# Patient Record
Sex: FEMALE | Race: OTHER | NOT HISPANIC OR LATINO
[De-identification: names, ages, dates, MRNs, and addresses within clinical notes are randomized per-mention and may not be internally consistent; named-entity substitution may affect disease eponyms.]

---

## 2022-08-08 ENCOUNTER — APPOINTMENT (OUTPATIENT)
Dept: INTERNAL MEDICINE | Facility: CLINIC | Age: 42
End: 2022-08-08

## 2023-09-07 ENCOUNTER — NON-APPOINTMENT (OUTPATIENT)
Age: 43
End: 2023-09-07

## 2023-09-07 ENCOUNTER — APPOINTMENT (OUTPATIENT)
Dept: INTERNAL MEDICINE | Facility: CLINIC | Age: 43
End: 2023-09-07
Payer: COMMERCIAL

## 2023-09-07 VITALS
HEART RATE: 81 BPM | TEMPERATURE: 98 F | SYSTOLIC BLOOD PRESSURE: 106 MMHG | WEIGHT: 136 LBS | DIASTOLIC BLOOD PRESSURE: 74 MMHG | OXYGEN SATURATION: 99 % | HEIGHT: 62.4 IN | BODY MASS INDEX: 24.71 KG/M2

## 2023-09-07 DIAGNOSIS — Z80.52 FAMILY HISTORY OF MALIGNANT NEOPLASM OF BLADDER: ICD-10-CM

## 2023-09-07 DIAGNOSIS — Z00.00 ENCOUNTER FOR GENERAL ADULT MEDICAL EXAMINATION W/OUT ABNORMAL FINDINGS: ICD-10-CM

## 2023-09-07 DIAGNOSIS — Z82.49 FAMILY HISTORY OF ISCHEMIC HEART DISEASE AND OTHER DISEASES OF THE CIRCULATORY SYSTEM: ICD-10-CM

## 2023-09-07 DIAGNOSIS — Z11.3 ENCOUNTER FOR SCREENING FOR INFECTIONS WITH A PREDOMINANTLY SEXUAL MODE OF TRANSMISSION: ICD-10-CM

## 2023-09-07 DIAGNOSIS — Z13.228 ENCOUNTER FOR SCREENING FOR OTHER METABOLIC DISORDERS: ICD-10-CM

## 2023-09-07 DIAGNOSIS — Z13.6 ENCOUNTER FOR SCREENING FOR CARDIOVASCULAR DISORDERS: ICD-10-CM

## 2023-09-07 PROCEDURE — 93000 ELECTROCARDIOGRAM COMPLETE: CPT

## 2023-09-07 PROCEDURE — 81003 URINALYSIS AUTO W/O SCOPE: CPT | Mod: QW

## 2023-09-07 PROCEDURE — 36415 COLL VENOUS BLD VENIPUNCTURE: CPT

## 2023-09-07 PROCEDURE — 99386 PREV VISIT NEW AGE 40-64: CPT | Mod: 25

## 2023-09-07 NOTE — PHYSICAL EXAM
[No Acute Distress] : no acute distress [Well-Appearing] : well-appearing [Normal Sclera/Conjunctiva] : normal sclera/conjunctiva [Normal Outer Ear/Nose] : the outer ears and nose were normal in appearance [No Lymphadenopathy] : no lymphadenopathy [No Respiratory Distress] : no respiratory distress  [No Accessory Muscle Use] : no accessory muscle use [Clear to Auscultation] : lungs were clear to auscultation bilaterally [Normal Rate] : normal rate  [Regular Rhythm] : with a regular rhythm [Normal S1, S2] : normal S1 and S2 [No Edema] : there was no peripheral edema [Soft] : abdomen soft [Non Tender] : non-tender [Non-distended] : non-distended [Normal Bowel Sounds] : normal bowel sounds [No Spinal Tenderness] : no spinal tenderness [Grossly Normal Strength/Tone] : grossly normal strength/tone [Normal Gait] : normal gait [Normal Affect] : the affect was normal [Alert and Oriented x3] : oriented to person, place, and time

## 2023-09-07 NOTE — HISTORY OF PRESENT ILLNESS
[de-identified] : 42 yo F presenting for CPE. Immunizations up to date. She would like to be referred to a psychiatrist to be evaluated for ADD.

## 2023-09-07 NOTE — HEALTH RISK ASSESSMENT
[Fair] : ~his/her~ current health as fair  [Good] : ~his/her~  mood as  good [No] : In the past 12 months have you used drugs other than those required for medical reasons? No [No falls in past year] : Patient reported no falls in the past year [0] : 2) Feeling down, depressed, or hopeless: Not at all (0) [Patient reported mammogram was normal] : Patient reported mammogram was normal [Patient reported PAP Smear was normal] : Patient reported PAP Smear was normal [HIV Test offered] : HIV Test offered [Hepatitis C test offered] : Hepatitis C test offered [With Significant Other] : lives with significant other [Unemployed] : unemployed [] :  [Feels Safe at Home] : Feels safe at home [Smoke Detector] : smoke detector [Carbon Monoxide Detector] : carbon monoxide detector [Safety elements used in home] : safety elements used in home [Seat Belt] :  uses seat belt [Sunscreen] : uses sunscreen [Never] : Never [FreeTextEntry1] : referral to be screened for ADHD [de-identified] : no [de-identified] : no [de-identified] : walking  [de-identified] : good [Change in mental status noted] : No change in mental status noted [Language] : denies difficulty with language [Behavior] : denies difficulty with behavior [Learning/Retaining New Information] : denies difficulty learning/retaining new information [Handling Complex Tasks] : denies difficulty handling complex tasks [Reasoning] : denies difficulty with reasoning [Spatial Ability and Orientation] : denies difficulty with spatial ability and orientation [Sexually Active] : not sexually active [Reports changes in hearing] : Reports no changes in hearing [Reports changes in vision] : Reports no changes in vision [Reports changes in dental health] : Reports no changes in dental health [MammogramDate] : 06/2023 [PapSmearDate] : 05/2023

## 2023-09-08 LAB
25(OH)D3 SERPL-MCNC: 35.6 NG/ML
ALBUMIN SERPL ELPH-MCNC: 4.7 G/DL
ALP BLD-CCNC: 64 U/L
ALT SERPL-CCNC: 9 U/L
ANION GAP SERPL CALC-SCNC: 13 MMOL/L
APPEARANCE: CLEAR
AST SERPL-CCNC: 11 U/L
BACTERIA: ABNORMAL /HPF
BILIRUB SERPL-MCNC: 0.5 MG/DL
BILIRUBIN URINE: NEGATIVE
BLOOD URINE: NEGATIVE
BUN SERPL-MCNC: 16 MG/DL
CALCIUM SERPL-MCNC: 9.6 MG/DL
CAST: 0 /LPF
CHLORIDE SERPL-SCNC: 104 MMOL/L
CHOLEST SERPL-MCNC: 254 MG/DL
CO2 SERPL-SCNC: 24 MMOL/L
COLOR: YELLOW
CREAT SERPL-MCNC: 0.83 MG/DL
EGFR: 90 ML/MIN/1.73M2
EPITHELIAL CELLS: 9 /HPF
ESTIMATED AVERAGE GLUCOSE: 108 MG/DL
GLUCOSE QUALITATIVE U: NEGATIVE MG/DL
GLUCOSE SERPL-MCNC: 111 MG/DL
HBA1C MFR BLD HPLC: 5.4 %
HDLC SERPL-MCNC: 74 MG/DL
HIV1+2 AB SPEC QL IA.RAPID: NONREACTIVE
KETONES URINE: NEGATIVE MG/DL
LDLC SERPL CALC-MCNC: 164 MG/DL
LEUKOCYTE ESTERASE URINE: NEGATIVE
MICROSCOPIC-UA: NORMAL
NITRITE URINE: NEGATIVE
NONHDLC SERPL-MCNC: 180 MG/DL
PH URINE: 6.5
POTASSIUM SERPL-SCNC: 4.3 MMOL/L
PROT SERPL-MCNC: 7 G/DL
PROTEIN URINE: NORMAL MG/DL
RED BLOOD CELLS URINE: 3 /HPF
SODIUM SERPL-SCNC: 140 MMOL/L
SPECIFIC GRAVITY URINE: 1.03
TRIGL SERPL-MCNC: 93 MG/DL
TSH SERPL-ACNC: 0.78 UIU/ML
UROBILINOGEN URINE: 1 MG/DL
VIT B12 SERPL-MCNC: 497 PG/ML
WHITE BLOOD CELLS URINE: 0 /HPF

## 2023-10-17 ENCOUNTER — APPOINTMENT (OUTPATIENT)
Dept: PSYCHIATRY | Facility: CLINIC | Age: 43
End: 2023-10-17
Payer: COMMERCIAL

## 2023-10-17 ENCOUNTER — TRANSCRIPTION ENCOUNTER (OUTPATIENT)
Age: 43
End: 2023-10-17

## 2023-10-17 PROCEDURE — 90791 PSYCH DIAGNOSTIC EVALUATION: CPT

## 2023-12-18 ENCOUNTER — APPOINTMENT (OUTPATIENT)
Dept: PSYCHIATRY | Facility: CLINIC | Age: 43
End: 2023-12-18
Payer: COMMERCIAL

## 2023-12-18 ENCOUNTER — OUTPATIENT (OUTPATIENT)
Dept: OUTPATIENT SERVICES | Facility: HOSPITAL | Age: 43
LOS: 1 days | Discharge: HOME | End: 2023-12-18

## 2023-12-18 PROCEDURE — 99205 OFFICE O/P NEW HI 60 MIN: CPT | Mod: 95

## 2023-12-19 NOTE — PSYCHOSOCIAL ASSESSMENT
[None known] : None known [Yes (add details)] : Patient attended school, home tutoring, or received education instruction at anytime in the past three months? 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 No [FreeTextEntry2] : The patient is currently  but  from her . They reside together in the same home for financial reasons. She has a five-year-old son with her current  and a fifteen-year-old son from a previous relationship. 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 This led to legal and financial struggles, especially as she was trying to maintain her visa status and caring for her two-year-old son without a partner's support. She then worked as a  and  until 2015, which is when she  her current , the father of her youngest child. Patient had a miscarriage, no further details discussed during the intake.  The patient demonstrates strength in managing daily parenting tasks despite her attention difficulties. She has established routines to help stay on track with her activities and parenting responsibilities. However, she faces financial challenges as she is currently unemployed and striving to achieve financial independence from her , with whom she has been  for several years. [FreeTextEntry3] : The patient indicates that her family resides in [place], and within the [place], her social [place] primarily consists of individuals she has met through her sons, such as other parents from their schools. Among these acquaintances, there is only one person with whom she feels comfortable sharing her confidences. She expresses a sense of loss because her friendships in the [place] have not been formed through mutual interests. 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

## 2023-12-19 NOTE — PLAN
[Admit to Program     (Add Program Admission information to a new column in the Admit/Discharge Flowsheet)] : Admit to program [Every ___ week(s)] : Medication Management: Every [unfilled] week(s) [Individual Therapy] : Individual Therapy [FreeTextEntry4] : The patient would likely benefit from a comprehensive treatment approach that includes neuropsychological testing to better understand her cognitive profile, psychotropic medication to manage symptoms of inattention, and individual therapy. The therapy could focus on cultivating organizational skills and behavioral modification strategies. Additionally, therapeutic work on self-esteem would be beneficial to challenge and improve her negative self-perception.

## 2023-12-19 NOTE — DISCUSSION/SUMMARY
[Low acute suicide risk] : Low acute suicide risk [No] : No [Not clinically indicated] : Safety Plan completed/updated (for individuals at risk): Not clinically indicated [Initial Plan] : Initial Plan [Articulate] : articulate [Insightful] : insightful [Motivated to participate in treatment] : motivated to participate in treatment [Motivated and ready for change] : motivated and ready for change [In good health] : in good health [Housing stability] : housing stability [Has vocational interests or hobbies] : has vocational interests or hobbies [English fluency] : English fluency [Mental Health] : Mental Health [FreeTextEntry2] : 3/18/2024 [FreeTextEntry3] : 12/18/2023 [Interpersonal Relationships] : Interpersonal Relationships [Initial] : Initial [every ___ weeks] : every [unfilled] weeks [___ times a week] : [unfilled] times a week [Treatment is no longer medically necessary as evidenced by:] : Treatment is no longer medically necessary as evidenced by: [None - Reason others did not participate:] : None - Reason others did not participate:  [Yes] : Yes [Psychiatric Provider/Prescriber] : Psychiatric Provider/Prescriber [FreeTextEntry4] : "I'd like to better manage my attention problems to help manage the household and complete my study." [de-identified] : Cultivating organization skills and behavioral modification strategies [de-identified] : 3/18/2024 [de-identified] : Improving self-esteem [de-identified] : 3/18/2024 [FreeTextEntry5] : The patient would likely benefit from a comprehensive treatment approach that includes neuropsychological testing to better understand her cognitive profile, psychotropic medication to manage symptoms of inattention, and individual therapy. The therapy could focus on cultivating organizational skills and behavioral modification strategies. Additionally, therapeutic work on self-esteem would be beneficial to challenge and improve her negative self-perception. [de-identified] : Improved attention functioning [FreeTextEntry1] : Patient denies any current or past suicidality. The following info is from the writer's clinical observation and clinical interview. Warning signs: depressed mood Risk factors: financial stress Protective factor: responsibility to her children, a goal to become a
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
## 2023-12-19 NOTE — DISCUSSION/SUMMARY
[Low acute suicide risk] : Low acute suicide risk [No] : No [Not clinically indicated] : Safety Plan completed/updated (for individuals at risk): Not clinically indicated [Initial Plan] : Initial Plan [Articulate] : articulate [Insightful] : insightful [Motivated to participate in treatment] : motivated to participate in treatment [Motivated and ready for change] : motivated and ready for change [In good health] : in good health [Housing stability] : housing stability [Has vocational interests or hobbies] : has vocational interests or hobbies [English fluency] : English fluency [Mental Health] : Mental Health [FreeTextEntry2] : 3/18/2024 [FreeTextEntry3] : 12/18/2023 [Interpersonal Relationships] : Interpersonal Relationships [Initial] : Initial [every ___ weeks] : every [unfilled] weeks [___ times a week] : [unfilled] times a week [Treatment is no longer medically necessary as evidenced by:] : Treatment is no longer medically necessary as evidenced by: [None - Reason others did not participate:] : None - Reason others did not participate:  [Yes] : Yes [Psychiatric Provider/Prescriber] : Psychiatric Provider/Prescriber [FreeTextEntry4] : "I'd like to better manage my attention problems to help manage the household and complete my study." [de-identified] : Cultivating organization skills and behavioral modification strategies [de-identified] : 3/18/2024 [de-identified] : Improving self-esteem [de-identified] : 3/18/2024 [FreeTextEntry5] : The patient would likely benefit from a comprehensive treatment approach that includes neuropsychological testing to better understand her cognitive profile, psychotropic medication to manage symptoms of inattention, and individual therapy. The therapy could focus on cultivating organizational skills and behavioral modification strategies. Additionally, therapeutic work on self-esteem would be beneficial to challenge and improve her negative self-perception. [de-identified] : Improved attention functioning [FreeTextEntry1] : Patient denies any current or past suicidality. The following info is from the writer's clinical observation and clinical interview. Warning signs: depressed mood Risk factors: financial stress Protective factor: responsibility to her children, a goal to become a

## 2023-12-19 NOTE — HISTORY OF PRESENT ILLNESS
[Not Applicable] : Not applicable [FreeTextEntry1] : Patient indicates the present problems of attention have been sustaining in her "whole life." The patient recounts enduring difficulties with schoolwork from an early age and being labeled as "lazy" by her family. She mentions that she needed assistance at school to maintain focus on tasks and experienced challenges with reading. At the age of [age], she underwent testing but was deemed "normal." The patient denies having any formally recognized learning disabilities or having received special education. [FreeTextEntry2] : Denied past inpatient/outpatient tx.  [FreeTextEntry3] : Denied

## 2023-12-19 NOTE — PSYCHOSOCIAL ASSESSMENT
[None known] : None known [Yes (add details)] : Patient attended school, home tutoring, or received education instruction at anytime in the past three months? 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 No [FreeTextEntry2] : The patient is currently  but  from her . They reside together in the same home for financial reasons. She has a five-year-old son with her current  and a fifteen-year-old son from a previous relationship. 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 This led to legal and financial struggles, especially as she was trying to maintain her visa status and caring for her two-year-old son without a partner's support. She then worked as a  and  until 2015, which is when she  her current , the father of her youngest child. Patient had a miscarriage, no further details discussed during the intake.  The patient demonstrates strength in managing daily parenting tasks despite her attention difficulties. She has established routines to help stay on track with her activities and parenting responsibilities. However, she faces financial challenges as she is currently unemployed and striving to achieve financial independence from her , with whom she has been  for several years. [FreeTextEntry3] : The patient indicates that her family resides in [place], and within the [place], her social [place] primarily consists of individuals she has met through her sons, such as other parents from their schools. Among these acquaintances, there is only one person with whom she feels comfortable sharing her confidences. She expresses a sense of loss because her friendships in the [place] have not been formed through mutual interests. 

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
## 2023-12-19 NOTE — REASON FOR VISIT
[Access issues (e.g., transportation, impaired mobility, etc.)] : due to patient's access issues [Telehealth (audio & video) - Individual/Group] : This visit was provided via telehealth using real-time 2-way audio visual technology. [Other Location: e.g. Home (Enter Location, City,State)___] : The provider was located at [unfilled]. [Home] : The patient, [unfilled], was located at home, [unfilled], at the time of the visit. [Verbal consent obtained from patient/other participant(s)] : Verbal consent for telehealth/telephonic services obtained from patient/other participant(s) [OK  to leave message] : OK  to leave message [Private Practice - Therapist/Psychologist] : Private Practice - Therapist/Psychologist [[facility] Provider/Facility] : [facility] Provider/Facility [Patient] : Patient [FreeTextEntry4] : 9:00 [FreeTextEntry3] : [email or web address] [FreeTextEntry5] : English [FreeTextEntry2] : Concerns about ADHD [FreeTextEntry1] : The patient reports experiencing attention issues that have affected her ability to study an online stenography program and manage household tasks. She frequently makes careless mistakes both at home and in her daily activities. She struggles to stay focused while studying and during conversations, often seeming to not listen when spoken to directly. Following through on instructions is a challenge for her, as is organizing tasks effectively. She has an aversion to, or dislikes, engaging in tasks that require sustained attention, such as studying or waiting in a line. She often misplaces personal items, such as her phone and purse, and is easily sidetracked by unrelated thoughts. Her forgetfulness is pervasive, and she has a tendency to jump from one task to another without completing them. Additionally, she indicates that sometimes she is unable to interact quietly with others and tends to overshare; she also becomes irritable or loses patience when multiple people speak to her at once.

## 2023-12-19 NOTE — ACTIVE PROBLEMS
[FreeTextEntry1] : The patient reports that symptoms of inattention have negatively affected her self-esteem, leading her to ruminate over social interactions, feel embarrassed about her disorganized home, and struggle with studying despite her efforts. She describes herself generally as a "happy person" with a "decent mood" but acknowledges that she is "easily frustrated by life," including routine chores and parenting tasks. She reports frequent skin and hair picking behavior when feeling anxious or distressed.  The patient has faced challenges with her weight and has experimented with various diets. She has a history of binge eating, particularly unhealthy foods like snacks and candy. Her eating habits oscillate between strict health-conscious diets and indulgence in unhealthy snacking. Since Halloween, she has been predominantly snacking on foods lacking nutritional value. She denies any history of purging behavior.

## 2023-12-19 NOTE — RISK ASSESSMENT
[Clinical Interview] : Clinical Interview [No known suicide factors] : No known suicide factors [ADHD] : ADHD [Change in provider or treatment (i.e., medications, psychotherapy, milieu)] : change in provider or treatment (i.e., medications, psychotherapy, milieu) [Triggering events leading to humiliation, shame, and/or despair] : triggering events leading to humiliation, shame, and/or despair (e.g. loss of relationship, financial or health status) (real or anticipated) [Inadequate social supports] : inadequate social supports [Ability to cope with stress] : ability to cope with stress [Responsibility to children, family, or others] : responsibility to children, family, or others [Engaged in work or school] : engaged in work or school [None in the patient's lifetime] : None in the patient's lifetime [None Known] : none known [No known risk factors] : No known risk factors [No, patient denies ideation or behavior] : No, patient denies ideation or behavior [Low acute suicide risk] : Low acute suicide risk [No] : No [Not clinically indicated] : Safety Plan completed/updated (for individuals at risk): Not clinically indicated [FreeTextEntry9] : No signs of acute risk. [FreeTextEntry1] : Denied any current or past suicidality. [FreeTextEntry6] : Patient denies any current/past suicidality.

## 2023-12-19 NOTE — REASON FOR VISIT
[Access issues (e.g., transportation, impaired mobility, etc.)] : due to patient's access issues [Telehealth (audio & video) - Individual/Group] : This visit was provided via telehealth using real-time 2-way audio visual technology. [Other Location: e.g. Home (Enter Location, City,State)___] : The provider was located at [unfilled]. [Home] : The patient, [unfilled], was located at home, [unfilled], at the time of the visit. [Verbal consent obtained from patient/other participant(s)] : Verbal consent for telehealth/telephonic services obtained from patient/other participant(s) [OK  to leave message] : OK  to leave message [Private Practice - Therapist/Psychologist] : Private Practice - Therapist/Psychologist [[facility] Provider/Facility] : [facility] Provider/Facility [Patient] : Patient [FreeTextEntry4] : 9:00 [FreeTextEntry3] : [email or web address] [FreeTextEntry5] : English [FreeTextEntry2] : Concerns about ADHD [FreeTextEntry1] : The patient reports experiencing attention issues that have affected her ability to study an online stenography program and manage household tasks. She frequently makes careless mistakes both at home and in her daily activities. She struggles to stay focused while studying and during conversations, often seeming to not listen when spoken to directly. Following through on instructions is a challenge for her, as is organizing tasks effectively. She has an aversion to, or dislikes, engaging in tasks that require sustained attention, such as studying or waiting in a line. She often misplaces personal items, such as her phone and purse, and is easily sidetracked by unrelated thoughts. Her forgetfulness is pervasive, and she has a tendency to jump from one task to another without completing them. Additionally, she indicates that sometimes she is unable to interact quietly with others and tends to overshare; she also becomes irritable or loses patience when multiple people speak to her at once.

## 2024-01-08 DIAGNOSIS — F43.23 ADJUSTMENT DISORDER WITH MIXED ANXIETY AND DEPRESSED MOOD: ICD-10-CM

## 2024-01-09 ENCOUNTER — APPOINTMENT (OUTPATIENT)
Dept: PSYCHIATRY | Facility: CLINIC | Age: 44
End: 2024-01-09
Payer: COMMERCIAL

## 2024-01-09 VITALS — HEIGHT: 62.4 IN | BODY MASS INDEX: 25.44 KG/M2 | WEIGHT: 140 LBS

## 2024-01-09 DIAGNOSIS — I34.1 NONRHEUMATIC MITRAL (VALVE) PROLAPSE: ICD-10-CM

## 2024-01-09 PROCEDURE — 99215 OFFICE O/P EST HI 40 MIN: CPT

## 2024-01-26 ENCOUNTER — APPOINTMENT (OUTPATIENT)
Dept: PSYCHIATRY | Facility: CLINIC | Age: 44
End: 2024-01-26
Payer: COMMERCIAL

## 2024-01-26 PROCEDURE — 99214 OFFICE O/P EST MOD 30 MIN: CPT | Mod: 95

## 2024-01-26 NOTE — REASON FOR VISIT
[Access issues (e.g., transportation, impaired mobility, etc.)] : due to patient's access issues [Telehealth (audio & video) - Individual/Group] : This visit was provided via telehealth using real-time 2-way audio visual technology. [Other Location: e.g. Home (Enter Location, City,State)___] : The provider was located at [unfilled]. [Home] : The patient, [unfilled], was located at home, [unfilled], at the time of the visit. [Verbal consent obtained from patient/other participant(s)] : Verbal consent for telehealth/telephonic services obtained from patient/other participant(s) [FreeTextEntry4] : 11:30a [FreeTextEntry5] : 12:15p

## 2024-01-26 NOTE — PHYSICAL EXAM
[Cooperative] : cooperative [Euthymic] : euthymic [Full] : full [Clear] : clear [Linear/Goal Directed] : linear/goal directed [None Reported] : none reported [Average] : average [WNL] : within normal limits [FreeTextEntry8] : "pretty good"

## 2024-01-26 NOTE — PSYCHOSOCIAL ASSESSMENT
[None known] : None known [Yes (add details)] : Patient attended school, home tutoring, or received education instruction at anytime in the past three months? 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 No [FreeTextEntry2] : The patient is currently  but  from her . They reside together in the same home for financial reasons. She has a five-year-old son with her current  and a fifteen-year-old son from a previous relationship. 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 This led to legal and financial struggles, especially as she was trying to maintain her visa status and caring for her two-year-old son without a partner's support. She then worked as a  and  until 2015, which is when she  her current , the father of her youngest child. Patient had a miscarriage, no further details discussed during the intake.  The patient demonstrates strength in managing daily parenting tasks despite her attention difficulties. She has established routines to help stay on track with her activities and parenting responsibilities. However, she faces financial challenges as she is currently unemployed and striving to achieve financial independence from her , with whom she has been  for several years. [FreeTextEntry3] : The patient indicates that her family resides in [place], and within the [place], her social [place] primarily consists of individuals she has met through her sons, such as other parents from their schools. Among these acquaintances, there is only one person with whom she feels comfortable sharing her confidences. She expresses a sense of loss because her friendships in the [place] have not been formed through mutual interests. 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

## 2024-01-26 NOTE — PLAN
[FreeTextEntry4] : Pt attending appts and starting medication for ADHD. [FreeTextEntry5] : PLAN: #) Initiate Concerta 18mg PO Qam #) f/u with psychiatry in 4 weeks

## 2024-01-26 NOTE — RISK ASSESSMENT
[No, patient denies ideation or behavior] : No, patient denies ideation or behavior [No] : No [FreeTextEntry9] : Low acute risk. No SI, plan, attempt. Low chronic risk - protective factors include family, connection to care, future orientation.

## 2024-01-26 NOTE — DISCUSSION/SUMMARY
[Low acute suicide risk] : Low acute suicide risk [No] : No [Not clinically indicated] : Safety Plan completed/updated (for individuals at risk): Not clinically indicated [FreeTextEntry1] : Patient denies any current or past suicidality. The following info is from the writer's clinical observation and clinical interview. Warning signs: depressed mood Risk factors: financial stress Protective factor: responsibility to her children, a goal to become a

## 2024-01-26 NOTE — SOCIAL HISTORY
[FreeTextEntry1] : Lives with  ( - in process of divorce), 17yo M, and 5yo M sons. Studying stenography.

## 2024-02-26 ENCOUNTER — APPOINTMENT (OUTPATIENT)
Dept: PSYCHIATRY | Facility: CLINIC | Age: 44
End: 2024-02-26
Payer: COMMERCIAL

## 2024-02-26 PROCEDURE — 99215 OFFICE O/P EST HI 40 MIN: CPT | Mod: 95

## 2024-02-26 RX ORDER — METHYLPHENIDATE HYDROCHLORIDE 18 MG/1
18 TABLET, EXTENDED RELEASE ORAL
Qty: 30 | Refills: 0 | Status: DISCONTINUED | COMMUNITY
Start: 2024-01-09 | End: 2024-02-26

## 2024-02-26 NOTE — PHYSICAL EXAM
[] : No [Individual reports tobacco use during the last 30 days?] : Individual reports tobacco use during the last 30 days? No [0 - 1 or 2] : 0 - 1 or 2 [0 - Never] : 0 - Never [0 - No] : 0 - No [5] : 5 [3] : 3 [4] : 4 [FreeTextEntry1] : 0 [SchwartzScore] : 40 [Cooperative] : cooperative [Euthymic] : euthymic [Clear] : clear [Full] : full [Linear/Goal Directed] : linear/goal directed [None Reported] : none reported [Average] : average [WNL] : within normal limits [FreeTextEntry8] : "pretty good"

## 2024-02-26 NOTE — SOCIAL HISTORY
[FreeTextEntry1] : Lives with  ( - in process of divorce), 15yo M, and 7yo M sons. Studying stenography.

## 2024-02-26 NOTE — DISCUSSION/SUMMARY
[Plan Review] : Plan Review [Able to manage surrounding demands and opportunities] : able to manage surrounding demands and opportunities [Attempting to realize their potential] : Attempting to realize their potential [Cognitively intact] : cognitively intact [Good impulse control] : good impulse control [Motivated to participate in treatment] : motivated to participate in treatment [Housing stability] : housing stability [In good health] : in good health [FreeTextEntry2] : 2/26/25 [FreeTextEntry3] : see intake note [FreeTextEntry5] : "Get ADHD under control..." [FreeTextEntry8] : n/a [Mental Health] : Mental Health [Continued - Progress made] : Continued - Progress made: [every ___ weeks] : every [unfilled] weeks [Yes - Participants:] : Yes - Participants: [Other rationale for transition/discharge:] : Other rationale for transition/discharge: [No - Explain] : No [Psychiatric Provider/Prescriber] : Psychiatric Provider/Prescriber [FreeTextEntry4] : "struggling with my focus - on tasks and study. Messy house. I'm forgetful and disorganized." [de-identified] : Trying to identify appropriate medication to manage sxs [de-identified] : 2/26/25 [de-identified] : Trialing ADHD medications [de-identified] : Increase SOS by 10% [de-identified] : in progress [de-identified] : currently at 40 [de-identified] : 2/26/25 [de-identified] : Sharon [de-identified] : Patient will need ongoing psych mgmt of ADHD [de-identified] : Sharon [Date of Last Physical Exam: _____] : Date of Last Physical Exam: [unfilled] [Date of Last Annual Labs: _____] : Date of Last Annual Labs: [unfilled] [Annual Review of Systems Completed?] : Annual Review of Systems Completed: Yes [Tobacco Screening Completed?] : Tobacco Screening Completed: Yes [Potential impact of patient’s physical health conditions on psychiatric care?] : Potential impact of patient's physical health conditions on psychiatric care: No [Does patient require any additional health services or referrals?] : Does patient require any additional health services or referrals: No [No] : No [Low acute suicide risk] : Low acute suicide risk [Not clinically indicated] : Safety Plan completed/updated (for individuals at risk): Not clinically indicated [FreeTextEntry1] : Patient denies any current or past suicidality. The following info is from the writer's clinical observation and clinical interview. Warning signs: depressed mood Risk factors: financial stress Protective factor: responsibility to her children, a goal to become a

## 2024-02-26 NOTE — PSYCHOSOCIAL ASSESSMENT
[None known] : None known [Yes (add details)] : Patient attended school, home tutoring, or received education instruction at anytime in the past three months? 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 No [FreeTextEntry2] : The patient is currently  but  from her . They reside together in the same home for financial reasons. She has a five-year-old son with her current  and a fifteen-year-old son from a previous relationship. 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 This led to legal and financial struggles, especially as she was trying to maintain her visa status and caring for her two-year-old son without a partner's support. She then worked as a  and  until 2015, which is when she  her current , the father of her youngest child. Patient had a miscarriage, no further details discussed during the intake.  The patient demonstrates strength in managing daily parenting tasks despite her attention difficulties. She has established routines to help stay on track with her activities and parenting responsibilities. However, she faces financial challenges as she is currently unemployed and striving to achieve financial independence from her , with whom she has been  for several years. [FreeTextEntry3] : The patient indicates that her family resides in [place], and within the [place], her social [place] primarily consists of individuals she has met through her sons, such as other parents from their schools. Among these acquaintances, there is only one person with whom she feels comfortable sharing her confidences. She expresses a sense of loss because her friendships in the [place] have not been formed through mutual interests. 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

## 2024-02-26 NOTE — PLAN
[Medication education provided] : Medication education provided. [Yes. details: ___] : Yes, [unfilled] [Order(s) for medication placed in [facility] EHR] : Medication [FreeTextEntry4] : Pt attending appts and starting medication for ADHD. [FreeTextEntry5] : PLAN: #) Increase Concerta 18mg to 27mg PO Qam #) f/u with psychiatry in 4 weeks

## 2024-02-26 NOTE — RISK ASSESSMENT
[Low acute suicide risk] : Low acute suicide risk [Not clinically indicated] : Safety Plan completed/updated (for individuals at risk): Not clinically indicated [No, patient denies ideation or behavior] : No, patient denies ideation or behavior [No] : No [FreeTextEntry9] : Low acute risk. No SI, plan, attempt. Low chronic risk - protective factors include family, connection to care, future orientation.

## 2024-02-26 NOTE — HISTORY OF PRESENT ILLNESS
[Not Applicable] : Not applicable [FreeTextEntry1] : Patient indicates the present problems of attention have been sustaining in her "whole life." The patient recounts enduring difficulties with schoolwork from an early age and being labeled as "lazy" by her family. She mentions that she needed assistance at school to maintain focus on tasks and experienced challenges with reading. At the age of [age], she underwent testing but was deemed "normal." The patient denies having any formally recognized learning disabilities or having received special education.  The patient reports that symptoms of inattention have negatively affected her self-esteem, leading her to ruminate over social interactions, feel embarrassed about her disorganized home, and struggle with studying despite her efforts. She describes herself generally as a "happy person" with a "decent mood" but acknowledges that she is "easily frustrated by life," including routine chores and parenting tasks. She reports frequent skin and hair picking behavior when feeling anxious or distressed.  The patient has faced challenges with her weight and has experimented with various diets. She has a history of binge eating, particularly unhealthy foods like snacks and candy. Her eating habits oscillate between strict health-conscious diets and indulgence in unhealthy snacking. Since Halloween, she has been predominantly snacking on foods lacking nutritional value. She denies any history of purging behavior. [FreeTextEntry2] : Denied past inpatient/outpatient tx.  [FreeTextEntry3] : Denied

## 2024-02-26 NOTE — PSYCHOSOCIAL ASSESSMENT
[None known] : None known [Yes (add details)] : Patient attended school, home tutoring, or received education instruction at anytime in the past three months? 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 No [FreeTextEntry3] : The patient indicates that her family resides in [place], and within the [place], her social [place] primarily consists of individuals she has met through her sons, such as other parents from their schools. Among these acquaintances, there is only one person with whom she feels comfortable sharing her confidences. She expresses a sense of loss because her friendships in the [place] have not been formed through mutual interests. [FreeTextEntry2] : The patient is currently  but  from her . They reside together in the same home for financial reasons. She has a five-year-old son with her current  and a fifteen-year-old son from a previous relationship. 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 This led to legal and financial struggles, especially as she was trying to maintain her visa status and caring for her two-year-old son without a partner's support. She then worked as a  and  until 2015, which is when she  her current , the father of her youngest child. Patient had a miscarriage, no further details discussed during the intake.  The patient demonstrates strength in managing daily parenting tasks despite her attention difficulties. She has established routines to help stay on track with her activities and parenting responsibilities. However, she faces financial challenges as she is currently unemployed and striving to achieve financial independence from her , with whom she has been  for several years. [FreeTextEntry1] : Patient is an immigrant from [place] with no family in the [place]

## 2024-02-26 NOTE — DISCUSSION/SUMMARY
[Able to manage surrounding demands and opportunities] : able to manage surrounding demands and opportunities [Plan Review] : Plan Review [Attempting to realize their potential] : Attempting to realize their potential [Cognitively intact] : cognitively intact [Good impulse control] : good impulse control [Motivated to participate in treatment] : motivated to participate in treatment [Housing stability] : housing stability [In good health] : in good health [FreeTextEntry2] : 2/26/25 [FreeTextEntry3] : see intake note [FreeTextEntry5] : "Get ADHD under control..." [FreeTextEntry8] : n/a [Mental Health] : Mental Health [Continued - Progress made] : Continued - Progress made: [every ___ weeks] : every [unfilled] weeks [Other rationale for transition/discharge:] : Other rationale for transition/discharge: [Yes - Participants:] : Yes - Participants: [No - Explain] : No [Psychiatric Provider/Prescriber] : Psychiatric Provider/Prescriber [FreeTextEntry4] : "struggling with my focus - on tasks and study. Messy house. I'm forgetful and disorganized." [de-identified] : Trying to identify appropriate medication to manage sxs [de-identified] : Trialing ADHD medications [de-identified] : 2/26/25 [de-identified] : Increase SOS by 10% [de-identified] : in progress [de-identified] : currently at 40 [de-identified] : 2/26/25 [de-identified] : Sharon [de-identified] : Patient will need ongoing psych mgmt of ADHD [de-identified] : Sharon [Date of Last Physical Exam: _____] : Date of Last Physical Exam: [unfilled] [Date of Last Annual Labs: _____] : Date of Last Annual Labs: [unfilled] [Annual Review of Systems Completed?] : Annual Review of Systems Completed: Yes [Tobacco Screening Completed?] : Tobacco Screening Completed: Yes [Potential impact of patient’s physical health conditions on psychiatric care?] : Potential impact of patient's physical health conditions on psychiatric care: No [Does patient require any additional health services or referrals?] : Does patient require any additional health services or referrals: No [No] : No [Low acute suicide risk] : Low acute suicide risk [Not clinically indicated] : Safety Plan completed/updated (for individuals at risk): Not clinically indicated [FreeTextEntry1] : Patient denies any current or past suicidality. The following info is from the writer's clinical observation and clinical interview. Warning signs: depressed mood Risk factors: financial stress Protective factor: responsibility to her children, a goal to become a

## 2024-02-26 NOTE — PLAN
[Yes. details: ___] : Yes, [unfilled] [Medication education provided] : Medication education provided. [Order(s) for medication placed in [facility] EHR] : Medication [FreeTextEntry4] : Pt attending appts and starting medication for ADHD. [FreeTextEntry5] : PLAN: #) Increase Concerta 18mg to 27mg PO Qam #) f/u with psychiatry in 4 weeks

## 2024-02-26 NOTE — PHYSICAL EXAM
[Individual reports tobacco use during the last 30 days?] : Individual reports tobacco use during the last 30 days? No [] : No [0 - 1 or 2] : 0 - 1 or 2 [0 - Never] : 0 - Never [0 - No] : 0 - No [5] : 5 [3] : 3 [4] : 4 [FreeTextEntry1] : 0 [SchwartzScore] : 40 [Cooperative] : cooperative [Euthymic] : euthymic [Full] : full [Clear] : clear [Linear/Goal Directed] : linear/goal directed [None Reported] : none reported [Average] : average [WNL] : within normal limits [FreeTextEntry8] : "pretty good"

## 2024-03-07 ENCOUNTER — APPOINTMENT (OUTPATIENT)
Dept: PSYCHIATRY | Facility: CLINIC | Age: 44
End: 2024-03-07

## 2024-03-08 DIAGNOSIS — F98.8 OTHER SPECIFIED BEHAVIORAL AND EMOTIONAL DISORDERS WITH ONSET USUALLY OCCURRING IN CHILDHOOD AND ADOLESCENCE: ICD-10-CM

## 2024-03-08 DIAGNOSIS — F90.0 ATTENTION-DEFICIT HYPERACTIVITY DISORDER, PREDOMINANTLY INATTENTIVE TYPE: ICD-10-CM

## 2024-03-12 ENCOUNTER — APPOINTMENT (OUTPATIENT)
Dept: PSYCHIATRY | Facility: CLINIC | Age: 44
End: 2024-03-12
Payer: COMMERCIAL

## 2024-03-12 PROCEDURE — 99213 OFFICE O/P EST LOW 20 MIN: CPT | Mod: 95

## 2024-03-12 NOTE — PSYCHOSOCIAL ASSESSMENT
[None known] : None known [Yes (add details)] : Patient attended school, home tutoring, or received education instruction at anytime in the past three months? 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 No [FreeTextEntry2] : The patient is currently  but  from her . They reside together in the same home for financial reasons. She has a five-year-old son with her current  and a fifteen-year-old son from a previous relationship. 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 This led to legal and financial struggles, especially as she was trying to maintain her visa status and caring for her two-year-old son without a partner's support. She then worked as a  and  until 2015, which is when she  her current , the father of her youngest child. Patient had a miscarriage, no further details discussed during the intake.  The patient demonstrates strength in managing daily parenting tasks despite her attention difficulties. She has established routines to help stay on track with her activities and parenting responsibilities. However, she faces financial challenges as she is currently unemployed and striving to achieve financial independence from her , with whom she has been  for several years. [FreeTextEntry3] : The patient indicates that her family resides in [place], and within the [place], her social [place] primarily consists of individuals she has met through her sons, such as other parents from their schools. Among these acquaintances, there is only one person with whom she feels comfortable sharing her confidences. She expresses a sense of loss because her friendships in the [place] have not been formed through mutual interests. 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

## 2024-03-12 NOTE — REASON FOR VISIT
[Access issues (e.g., transportation, impaired mobility, etc.)] : due to patient's access issues [Telehealth (audio & video) - Individual/Group] : This visit was provided via telehealth using real-time 2-way audio visual technology. [Other Location: e.g. Home (Enter Location, City,State)___] : The provider was located at [unfilled]. [Verbal consent obtained from patient/other participant(s)] : Verbal consent for telehealth/telephonic services obtained from patient/other participant(s) [Home] : The patient, [unfilled], was located at home, [unfilled], at the time of the visit. [FreeTextEntry4] : 11:30a [FreeTextEntry5] : 12:15p

## 2024-03-12 NOTE — SOCIAL HISTORY
[FreeTextEntry1] : Lives with  ( - in process of divorce), 15yo M, and 5yo M sons. Studying stenography.

## 2024-03-12 NOTE — PLAN
[Yes. details: ___] : Yes, [unfilled] [Medication education provided] : Medication education provided. [Order(s) for medication placed in [facility] EHR] : Medication [FreeTextEntry4] : Pt attending appts and starting medication for ADHD. [FreeTextEntry5] : PLAN: #) d/c Concerta 27mg PO Qam #) Initiate adderall xr 10mg PO Qam #) f/u with psychiatry in 2 weeks

## 2024-03-12 NOTE — PHYSICAL EXAM
Latex:  Patient denies allergy to latex.  Medications reviewed with patient.  Tobacco use verified.  Allergies verified.    REFERRING MD:   at School  Primary Medical Doctor: Karen Reyes MD   DATE OF INJURY/SURGERY:  DOI Dec 2020, DOS 5/13/21  WORK RELATED: no  OCCUPATION: student    Pt is accompanied by father.    Patient is here for LEFT knee arthroscopy, chondroplasty, loose body removal, VMO reefing follow up.  Reports no porblems with the knee.  Not taking anything for pain.     [Cooperative] : cooperative [Euthymic] : euthymic [Full] : full [Clear] : clear [Linear/Goal Directed] : linear/goal directed [None Reported] : none reported [Average] : average [WNL] : within normal limits [FreeTextEntry8] : "pretty good"

## 2024-03-13 ENCOUNTER — APPOINTMENT (OUTPATIENT)
Dept: PSYCHIATRY | Facility: CLINIC | Age: 44
End: 2024-03-13

## 2024-03-28 ENCOUNTER — APPOINTMENT (OUTPATIENT)
Dept: PSYCHIATRY | Facility: CLINIC | Age: 44
End: 2024-03-28
Payer: COMMERCIAL

## 2024-03-28 PROCEDURE — 90834 PSYTX W PT 45 MINUTES: CPT | Mod: 95

## 2024-03-28 NOTE — PLAN
[FreeTextEntry2] : Pt to engage in weekly or biweekly individual psychotherapy to address sxs of ADHD, binge eating, and social anxiety  [Cognitive and/or Behavior Therapy] : Cognitive and/or Behavior Therapy  [Dialectical Behavior Therapy] : Dialectical Behavior Therapy  [Psychodynamic Therapy] : Psychodynamic Therapy  [Integrative Therapy] : Integrative Therapy  [Psychoeducation] : Psychoeducation  [de-identified] : Pt arrived on time for scheduled session. Presented as engaged and motivated for txt; affect was stable and euthymic. Session focused on rapport building and information gathering as well as goal setting, as this was pt's first session with writer. Writer provided support, feedback, and coping skills as needed throughout. Ended session with pt in bx and ex control.

## 2024-03-28 NOTE — REASON FOR VISIT
[Patient preference] : as per patient preference [Telehealth (audio & video) - Individual/Group] : This visit was provided via telehealth using real-time 2-way audio visual technology. [Other Location: e.g. Home (Enter Location, City,State)___] : The provider was located at [unfilled]. [Home] : The patient, [unfilled], was located at home, [unfilled], at the time of the visit. [Verbal consent obtained from patient/other participant(s)] : Verbal consent for telehealth/telephonic services obtained from patient/other participant(s) [FreeTextEntry4] : 12:45pm [FreeTextEntry5] : 1:30pm [Patient] : Patient [FreeTextEntry1] : To address sxs of adhd

## 2024-03-28 NOTE — RISK ASSESSMENT
[No, patient denies ideation or behavior] : No, patient denies ideation or behavior [ADHD] : ADHD [Impulsivity] : impulsivity [Identifies reasons for living] : identifies reasons for living [Supportive social network of family or friends] : supportive social network of family or friends [Positive therapeutic relationships] : positive therapeutic relationships [Ability to cope with stress] : ability to cope with stress [Responsibility to children, family, or others] : responsibility to children, family, or others [Low acute suicide risk] : Low acute suicide risk [No] : No [Not clinically indicated] : Safety Plan completed/updated (for individuals at risk): Not clinically indicated

## 2024-03-28 NOTE — DISCUSSION/SUMMARY
[Does patient require any additional health services or referrals?] : Does patient require any additional health services or referrals: No [Potential impact of patient’s physical health conditions on psychiatric care?] : Potential impact of patient's physical health conditions on psychiatric care: No

## 2024-03-29 ENCOUNTER — APPOINTMENT (OUTPATIENT)
Dept: PSYCHIATRY | Facility: CLINIC | Age: 44
End: 2024-03-29
Payer: COMMERCIAL

## 2024-03-29 PROCEDURE — 99214 OFFICE O/P EST MOD 30 MIN: CPT | Mod: 95

## 2024-03-29 RX ORDER — METHYLPHENIDATE HYDROCHLORIDE 27 MG/1
27 TABLET, EXTENDED RELEASE ORAL DAILY
Qty: 14 | Refills: 0 | Status: DISCONTINUED | COMMUNITY
Start: 2024-02-26 | End: 2024-03-29

## 2024-03-29 NOTE — SOCIAL HISTORY
[FreeTextEntry1] : Lives with  ( - in process of divorce), 17yo M, and 7yo M sons. Studying stenography.

## 2024-03-29 NOTE — RISK ASSESSMENT
[No, patient denies ideation or behavior] : No, patient denies ideation or behavior [FreeTextEntry9] : Low acute risk. No SI, plan, attempt. Low chronic risk - protective factors include family, connection to care, future orientation.  [Low acute suicide risk] : Low acute suicide risk [No] : No [Not clinically indicated] : Safety Plan completed/updated (for individuals at risk): Not clinically indicated

## 2024-03-29 NOTE — DISCUSSION/SUMMARY
[No] : No [Low acute suicide risk] : Low acute suicide risk [Not clinically indicated] : Safety Plan completed/updated (for individuals at risk): Not clinically indicated [FreeTextEntry1] : Patient denies any current or past suicidality. The following info is from the writer's clinical observation and clinical interview. Warning signs: depressed mood Risk factors: financial stress Protective factor: responsibility to her children, a goal to become a

## 2024-03-29 NOTE — PHYSICAL EXAM
[Cooperative] : cooperative [Euthymic] : euthymic [Full] : full [Clear] : clear [Linear/Goal Directed] : linear/goal directed [None Reported] : none reported [Average] : average [WNL] : within normal limits [FreeTextEntry8] : "ok"

## 2024-03-29 NOTE — PLAN
[Yes. details: ___] : Yes, [unfilled] [Medication education provided] : Medication education provided. [Order(s) for medication placed in [facility] EHR] : Medication [FreeTextEntry4] : Pt attending appts and starting medication for ADHD. [FreeTextEntry5] : PLAN: #) Initiate adderall xr 10mg PO Qam #) f/u with psychiatry in 3 weeks #) continue I therapy

## 2024-03-29 NOTE — PSYCHOSOCIAL ASSESSMENT
[None known] : None known [Yes (add details)] : Patient attended school, home tutoring, or received education instruction at anytime in the past three months? 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 No [FreeTextEntry2] : The patient is currently  but  from her . They reside together in the same home for financial reasons. She has a five-year-old son with her current  and a fifteen-year-old son from a previous relationship. 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 This led to legal and financial struggles, especially as she was trying to maintain her visa status and caring for her two-year-old son without a partner's support. She then worked as a  and  until 2015, which is when she  her current , the father of her youngest child. Patient had a miscarriage, no further details discussed during the intake.  The patient demonstrates strength in managing daily parenting tasks despite her attention difficulties. She has established routines to help stay on track with her activities and parenting responsibilities. However, she faces financial challenges as she is currently unemployed and striving to achieve financial independence from her , with whom she has been  for several years. [FreeTextEntry3] : The patient indicates that her family resides in [place], and within the [place], her social [place] primarily consists of individuals she has met through her sons, such as other parents from their schools. Among these acquaintances, there is only one person with whom she feels comfortable sharing her confidences. She expresses a sense of loss because her friendships in the [place] have not been formed through mutual interests. 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

## 2024-04-19 ENCOUNTER — APPOINTMENT (OUTPATIENT)
Dept: PSYCHIATRY | Facility: CLINIC | Age: 44
End: 2024-04-19

## 2024-04-26 ENCOUNTER — APPOINTMENT (OUTPATIENT)
Dept: PSYCHIATRY | Facility: CLINIC | Age: 44
End: 2024-04-26
Payer: COMMERCIAL

## 2024-04-26 PROCEDURE — 90833 PSYTX W PT W E/M 30 MIN: CPT | Mod: 95

## 2024-04-26 PROCEDURE — 99214 OFFICE O/P EST MOD 30 MIN: CPT | Mod: 95

## 2024-04-26 RX ORDER — DEXTROAMPHETAMINE SACCHARATE, AMPHETAMINE ASPARTATE MONOHYDRATE, DEXTROAMPHETAMINE SULFATE AND AMPHETAMINE SULFATE 2.5; 2.5; 2.5; 2.5 MG/1; MG/1; MG/1; MG/1
10 CAPSULE, EXTENDED RELEASE ORAL DAILY
Qty: 30 | Refills: 0 | Status: DISCONTINUED | COMMUNITY
Start: 2024-03-12 | End: 2024-04-26

## 2024-04-26 NOTE — REASON FOR VISIT
[Access issues (e.g., transportation, impaired mobility, etc.)] : due to patient's access issues [Telehealth (audio & video) - Individual/Group] : This visit was provided via telehealth using real-time 2-way audio visual technology. [Other Location: e.g. Home (Enter Location, City,State)___] : The provider was located at [unfilled]. [Home] : The patient, [unfilled], was located at home, [unfilled], at the time of the visit. [Verbal consent obtained from patient/other participant(s)] : Verbal consent for telehealth/telephonic services obtained from patient/other participant(s) [FreeTextEntry4] : 11:00a [FreeTextEntry5] : 11:30a

## 2024-04-26 NOTE — PSYCHOSOCIAL ASSESSMENT
[None known] : None known [Yes (add details)] : Patient attended school, home tutoring, or received education instruction at anytime in the past three months? 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 No [FreeTextEntry2] : The patient is currently  but  from her . They reside together in the same home for financial reasons. She has a five-year-old son with her current  and a fifteen-year-old son from a previous relationship. 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 This led to legal and financial struggles, especially as she was trying to maintain her visa status and caring for her two-year-old son without a partner's support. She then worked as a  and  until 2015, which is when she  her current , the father of her youngest child. Patient had a miscarriage, no further details discussed during the intake.  The patient demonstrates strength in managing daily parenting tasks despite her attention difficulties. She has established routines to help stay on track with her activities and parenting responsibilities. However, she faces financial challenges as she is currently unemployed and striving to achieve financial independence from her , with whom she has been  for several years. [FreeTextEntry3] : The patient indicates that her family resides in [place], and within the [place], her social [place] primarily consists of individuals she has met through her sons, such as other parents from their schools. Among these acquaintances, there is only one person with whom she feels comfortable sharing her confidences. She expresses a sense of loss because her friendships in the [place] have not been formed through mutual interests. 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

## 2024-04-26 NOTE — PLAN
[Yes. details: ___] : Yes, [unfilled] [Medication education provided] : Medication education provided. [Order(s) for medication placed in [facility] EHR] : Medication [FreeTextEntry4] : Pt attending appts and starting medication for ADHD. [FreeTextEntry5] : PLAN: #) Increase adderall xr 10mg to 15mg PO Qam #) f/u with psychiatry in 4 weeks #) continue I therapy

## 2024-04-26 NOTE — RISK ASSESSMENT
[No, patient denies ideation or behavior] : No, patient denies ideation or behavior [Low acute suicide risk] : Low acute suicide risk [No] : No [Not clinically indicated] : Safety Plan completed/updated (for individuals at risk): Not clinically indicated [FreeTextEntry9] : Low acute risk. No SI, plan, attempt. Low chronic risk - protective factors include family, connection to care, future orientation.

## 2024-05-01 ENCOUNTER — APPOINTMENT (OUTPATIENT)
Dept: PSYCHIATRY | Facility: CLINIC | Age: 44
End: 2024-05-01
Payer: COMMERCIAL

## 2024-05-01 PROCEDURE — 90834 PSYTX W PT 45 MINUTES: CPT | Mod: 95

## 2024-05-01 NOTE — DISCUSSION/SUMMARY
[Potential impact of patient’s physical health conditions on psychiatric care?] : Potential impact of patient's physical health conditions on psychiatric care: No [Does patient require any additional health services or referrals?] : Does patient require any additional health services or referrals: No

## 2024-05-01 NOTE — RISK ASSESSMENT
[No, patient denies ideation or behavior] : No, patient denies ideation or behavior [ADHD] : ADHD [Impulsivity] : impulsivity [Identifies reasons for living] : identifies reasons for living [Supportive social network of family or friends] : supportive social network of family or friends [Ability to cope with stress] : ability to cope with stress [Positive therapeutic relationships] : positive therapeutic relationships [Responsibility to children, family, or others] : responsibility to children, family, or others [Low acute suicide risk] : Low acute suicide risk [No] : No [Not clinically indicated] : Safety Plan completed/updated (for individuals at risk): Not clinically indicated

## 2024-05-01 NOTE — PLAN
[FreeTextEntry2] : Pt to engage in weekly or biweekly individual psychotherapy to address sxs of ADHD, binge eating, and social anxiety  [Cognitive and/or Behavior Therapy] : Cognitive and/or Behavior Therapy  [Dialectical Behavior Therapy] : Dialectical Behavior Therapy  [Integrative Therapy] : Integrative Therapy  [Psychodynamic Therapy] : Psychodynamic Therapy  [Psychoeducation] : Psychoeducation  [de-identified] : Pt arrived on time for scheduled session. Presented as engaged and motivated for txt; affect was stable and euthymic. Session focused on dynamics btwn pt, her , and 's family. Explored pt's difficulty with conflict and fears of being a burden to others - inside and outside of session. Writer provided support, feedback, and coping skills as needed throughout. Ended session with pt in bx and ex control.

## 2024-05-16 ENCOUNTER — APPOINTMENT (OUTPATIENT)
Dept: PSYCHIATRY | Facility: CLINIC | Age: 44
End: 2024-05-16
Payer: COMMERCIAL

## 2024-05-16 PROCEDURE — 90834 PSYTX W PT 45 MINUTES: CPT | Mod: 95

## 2024-05-17 NOTE — PLAN
[FreeTextEntry2] : Pt to engage in weekly or biweekly individual psychotherapy to address sxs of ADHD, binge eating, and social anxiety  [Cognitive and/or Behavior Therapy] : Cognitive and/or Behavior Therapy  [Dialectical Behavior Therapy] : Dialectical Behavior Therapy  [Integrative Therapy] : Integrative Therapy  [Psychodynamic Therapy] : Psychodynamic Therapy  [Psychoeducation] : Psychoeducation  [de-identified] : Pt arrived on time for scheduled session. Presented as engaged and motivated for txt; affect was stable and euthymic. Session focused on exploring pt's early relationship dynamics and their impact on present day interpersonal difficulties. Writer provided support, feedback, and coping skills as needed throughout. Ended session with pt in bx and ex control.

## 2024-05-23 ENCOUNTER — APPOINTMENT (OUTPATIENT)
Dept: PSYCHIATRY | Facility: CLINIC | Age: 44
End: 2024-05-23
Payer: COMMERCIAL

## 2024-05-23 PROCEDURE — 90834 PSYTX W PT 45 MINUTES: CPT | Mod: 95

## 2024-05-23 NOTE — PLAN
[FreeTextEntry2] : Pt to engage in weekly or biweekly individual psychotherapy to address sxs of ADHD, binge eating, and social anxiety  [Cognitive and/or Behavior Therapy] : Cognitive and/or Behavior Therapy  [Dialectical Behavior Therapy] : Dialectical Behavior Therapy  [Integrative Therapy] : Integrative Therapy  [Psychodynamic Therapy] : Psychodynamic Therapy  [Psychoeducation] : Psychoeducation  [de-identified] : Pt arrived on time for scheduled session. Presented as engaged and motivated for txt; affect was stable and euthymic. Session focused on pt's reactions to ex-'s planned date this evening. Further explored pt's relationship w ex and current "set up" in being split up but living together. Writer provided psychoeducation on attachment theory and object relations and how they might be playing out for pt in her choices of partners in adult life. Writer provided support, feedback, and coping skills as needed throughout. Ended session with pt in bx and ex control.

## 2024-05-23 NOTE — RISK ASSESSMENT
[No, patient denies ideation or behavior] : No, patient denies ideation or behavior [ADHD] : ADHD [Impulsivity] : impulsivity [Identifies reasons for living] : identifies reasons for living [Supportive social network of family or friends] : supportive social network of family or friends [Ability to cope with stress] : ability to cope with stress [Positive therapeutic relationships] : positive therapeutic relationships [Responsibility to children, family, or others] : responsibility to children, family, or others [Low acute suicide risk] : Low acute suicide risk [No] : No [Not clinically indicated] : Safety Plan completed/updated (for individuals at risk): Not clinically indicated Patient

## 2024-05-24 ENCOUNTER — APPOINTMENT (OUTPATIENT)
Dept: PSYCHIATRY | Facility: CLINIC | Age: 44
End: 2024-05-24
Payer: COMMERCIAL

## 2024-05-24 PROCEDURE — 99214 OFFICE O/P EST MOD 30 MIN: CPT | Mod: 95

## 2024-05-24 PROCEDURE — G2211 COMPLEX E/M VISIT ADD ON: CPT | Mod: NC,1L,95

## 2024-05-24 PROCEDURE — 90833 PSYTX W PT W E/M 30 MIN: CPT | Mod: 95

## 2024-05-24 RX ORDER — DEXTROAMPHETAMINE SACCHARATE, AMPHETAMINE ASPARTATE MONOHYDRATE, DEXTROAMPHETAMINE SULFATE AND AMPHETAMINE SULFATE 5; 5; 5; 5 MG/1; MG/1; MG/1; MG/1
20 CAPSULE, EXTENDED RELEASE ORAL EVERY MORNING
Qty: 30 | Refills: 0 | Status: ACTIVE | COMMUNITY
Start: 2024-05-24 | End: 1900-01-01

## 2024-05-24 RX ORDER — DEXTROAMPHETAMINE SACCHARATE, AMPHETAMINE ASPARTATE MONOHYDRATE, DEXTROAMPHETAMINE SULFATE AND AMPHETAMINE SULFATE 3.75; 3.75; 3.75; 3.75 MG/1; MG/1; MG/1; MG/1
15 CAPSULE, EXTENDED RELEASE ORAL DAILY
Qty: 30 | Refills: 0 | Status: DISCONTINUED | COMMUNITY
Start: 2024-04-26 | End: 2024-05-24

## 2024-05-24 NOTE — PLAN
[Yes. details: ___] : Yes, [unfilled] [Medication education provided] : Medication education provided. [Order(s) for medication placed in [facility] EHR] : Medication [FreeTextEntry4] : Pt attending appts and starting medication for ADHD. [FreeTextEntry5] : PLAN: #) Increase adderall xr 10mg to 15mg PO Qam #) f/u with psychiatry in 4 weeks #) continue I therapy

## 2024-05-24 NOTE — REASON FOR VISIT
[Access issues (e.g., transportation, impaired mobility, etc.)] : due to patient's access issues [Telehealth (audio & video) - Individual/Group] : This visit was provided via telehealth using real-time 2-way audio visual technology. [Other Location: e.g. Home (Enter Location, City,State)___] : The provider was located at [unfilled]. [Home] : The patient, [unfilled], was located at home, [unfilled], at the time of the visit. [Verbal consent obtained from patient/other participant(s)] : Verbal consent for telehealth/telephonic services obtained from patient/other participant(s) [FreeTextEntry4] : 10:30a [FreeTextEntry5] : 11:00a

## 2024-05-24 NOTE — PSYCHOSOCIAL ASSESSMENT
[None known] : None known [Yes (add details)] : Patient attended school, home tutoring, or received education instruction at anytime in the past three months? 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 No [FreeTextEntry2] : The patient is currently  but  from her . They reside together in the same home for financial reasons. She has a five-year-old son with her current  and a fifteen-year-old son from a previous relationship. 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 This led to legal and financial struggles, especially as she was trying to maintain her visa status and caring for her two-year-old son without a partner's support. She then worked as a  and  until 2015, which is when she  her current , the father of her youngest child. Patient had a miscarriage, no further details discussed during the intake.  The patient demonstrates strength in managing daily parenting tasks despite her attention difficulties. She has established routines to help stay on track with her activities and parenting responsibilities. However, she faces financial challenges as she is currently unemployed and striving to achieve financial independence from her , with whom she has been  for several years. [FreeTextEntry3] : The patient indicates that her family resides in [place], and within the [place], her social [place] primarily consists of individuals she has met through her sons, such as other parents from their schools. Among these acquaintances, there is only one person with whom she feels comfortable sharing her confidences. She expresses a sense of loss because her friendships in the [place] have not been formed through mutual interests. 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

## 2024-05-30 ENCOUNTER — APPOINTMENT (OUTPATIENT)
Dept: PSYCHIATRY | Facility: CLINIC | Age: 44
End: 2024-05-30
Payer: COMMERCIAL

## 2024-05-30 DIAGNOSIS — F98.8 OTHER SPECIFIED BEHAVIORAL AND EMOTIONAL DISORDERS WITH ONSET USUALLY OCCURRING IN CHILDHOOD AND ADOLESCENCE: ICD-10-CM

## 2024-05-30 PROCEDURE — 90834 PSYTX W PT 45 MINUTES: CPT | Mod: 95

## 2024-05-31 PROBLEM — F98.8 ATTENTION DEFICIT DISORDER OF ADULT: Status: ACTIVE | Noted: 2023-09-07

## 2024-05-31 NOTE — PLAN
[FreeTextEntry2] : Pt to engage in weekly or biweekly individual psychotherapy to address sxs of ADHD, binge eating, and social anxiety  [Cognitive and/or Behavior Therapy] : Cognitive and/or Behavior Therapy  [Dialectical Behavior Therapy] : Dialectical Behavior Therapy  [Integrative Therapy] : Integrative Therapy  [Psychodynamic Therapy] : Psychodynamic Therapy  [Psychoeducation] : Psychoeducation  [de-identified] : Pt arrived on time for scheduled session. Presented as engaged and motivated for txt; affect was stable and euthymic. Session focused on themes related to recent/upcoming changes in relationship with ex and impact on pt. Also discussed ways in which pt has lost touch w herself and strategies for becoming more connected with self again. Writer provided support, feedback, and coping skills as needed throughout. Ended session with pt in bx and ex control.

## 2024-06-01 ENCOUNTER — OUTPATIENT (OUTPATIENT)
Dept: OUTPATIENT SERVICES | Facility: HOSPITAL | Age: 44
LOS: 1 days | Discharge: HOME | End: 2024-06-01

## 2024-06-06 ENCOUNTER — APPOINTMENT (OUTPATIENT)
Dept: PSYCHIATRY | Facility: CLINIC | Age: 44
End: 2024-06-06

## 2024-06-13 ENCOUNTER — APPOINTMENT (OUTPATIENT)
Dept: PSYCHIATRY | Facility: CLINIC | Age: 44
End: 2024-06-13

## 2024-06-13 DIAGNOSIS — F43.23 ADJUSTMENT DISORDER WITH MIXED ANXIETY AND DEPRESSED MOOD: ICD-10-CM

## 2024-06-13 DIAGNOSIS — F98.8 OTHER SPECIFIED BEHAVIORAL AND EMOTIONAL DISORDERS WITH ONSET USUALLY OCCURRING IN CHILDHOOD AND ADOLESCENCE: ICD-10-CM

## 2024-06-13 DIAGNOSIS — F90.0 ATTENTION-DEFICIT HYPERACTIVITY DISORDER, PREDOMINANTLY INATTENTIVE TYPE: ICD-10-CM

## 2024-06-20 ENCOUNTER — APPOINTMENT (OUTPATIENT)
Dept: PSYCHIATRY | Facility: CLINIC | Age: 44
End: 2024-06-20

## 2024-06-21 ENCOUNTER — APPOINTMENT (OUTPATIENT)
Dept: PSYCHIATRY | Facility: CLINIC | Age: 44
End: 2024-06-21
Payer: COMMERCIAL

## 2024-06-21 PROCEDURE — 90833 PSYTX W PT W E/M 30 MIN: CPT | Mod: 95

## 2024-06-21 PROCEDURE — 99214 OFFICE O/P EST MOD 30 MIN: CPT | Mod: 95

## 2024-06-21 PROCEDURE — G2211 COMPLEX E/M VISIT ADD ON: CPT | Mod: NC,95

## 2024-06-21 RX ORDER — DEXTROAMPHETAMINE SACCHARATE, AMPHETAMINE ASPARTATE MONOHYDRATE, DEXTROAMPHETAMINE SULFATE AND AMPHETAMINE SULFATE 1.25; 1.25; 1.25; 1.25 MG/1; MG/1; MG/1; MG/1
5 CAPSULE, EXTENDED RELEASE ORAL EVERY MORNING
Qty: 30 | Refills: 0 | Status: ACTIVE | COMMUNITY
Start: 2024-06-21 | End: 1900-01-01

## 2024-06-21 RX ORDER — DEXTROAMPHETAMINE SACCHARATE, AMPHETAMINE ASPARTATE MONOHYDRATE, DEXTROAMPHETAMINE SULFATE AND AMPHETAMINE SULFATE 3.75; 3.75; 3.75; 3.75 MG/1; MG/1; MG/1; MG/1
15 CAPSULE, EXTENDED RELEASE ORAL DAILY
Qty: 30 | Refills: 0 | Status: ACTIVE | COMMUNITY
Start: 2024-06-21 | End: 1900-01-01

## 2024-06-21 NOTE — PSYCHOSOCIAL ASSESSMENT
[None known] : None known [Yes (add details)] : Patient attended school, home tutoring, or received education instruction at anytime in the past three months? 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 No [FreeTextEntry2] : The patient is currently  but  from her . They reside together in the same home for financial reasons. She has a five-year-old son with her current  and a fifteen-year-old son from a previous relationship. 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 This led to legal and financial struggles, especially as she was trying to maintain her visa status and caring for her two-year-old son without a partner's support. She then worked as a  and  until 2015, which is when she  her current , the father of her youngest child. Patient had a miscarriage, no further details discussed during the intake.  The patient demonstrates strength in managing daily parenting tasks despite her attention difficulties. She has established routines to help stay on track with her activities and parenting responsibilities. However, she faces financial challenges as she is currently unemployed and striving to achieve financial independence from her , with whom she has been  for several years. [FreeTextEntry3] : The patient indicates that her family resides in [place], and within the [place], her social [place] primarily consists of individuals she has met through her sons, such as other parents from their schools. Among these acquaintances, there is only one person with whom she feels comfortable sharing her confidences. She expresses a sense of loss because her friendships in the [place] have not been formed through mutual interests. 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

## 2024-06-21 NOTE — PLAN
[Yes. details: ___] : Yes, [unfilled] [Medication education provided] : Medication education provided. [Order(s) for medication placed in [facility] EHR] : Medication [FreeTextEntry4] : Pt attending appts and starting medication for ADHD. [FreeTextEntry5] : PLAN: #) continue adderall xr - can alternate btw 20mg to 15mg PO Qam #) f/u with psychiatry in 4 weeks #) continue I therapy

## 2024-06-27 ENCOUNTER — APPOINTMENT (OUTPATIENT)
Dept: PSYCHIATRY | Facility: CLINIC | Age: 44
End: 2024-06-27
Payer: COMMERCIAL

## 2024-06-27 DIAGNOSIS — F90.0 ATTENTION-DEFICIT HYPERACTIVITY DISORDER, PREDOMINANTLY INATTENTIVE TYPE: ICD-10-CM

## 2024-06-27 PROCEDURE — 90834 PSYTX W PT 45 MINUTES: CPT | Mod: 95

## 2024-07-11 ENCOUNTER — APPOINTMENT (OUTPATIENT)
Dept: PSYCHIATRY | Facility: CLINIC | Age: 44
End: 2024-07-11
Payer: COMMERCIAL

## 2024-07-11 PROCEDURE — 90834 PSYTX W PT 45 MINUTES: CPT | Mod: 95

## 2024-07-17 ENCOUNTER — APPOINTMENT (OUTPATIENT)
Dept: PSYCHIATRY | Facility: CLINIC | Age: 44
End: 2024-07-17
Payer: COMMERCIAL

## 2024-07-17 PROCEDURE — G2211 COMPLEX E/M VISIT ADD ON: CPT | Mod: NC,95

## 2024-07-17 PROCEDURE — 99213 OFFICE O/P EST LOW 20 MIN: CPT | Mod: 95

## 2024-07-18 ENCOUNTER — APPOINTMENT (OUTPATIENT)
Dept: PSYCHIATRY | Facility: CLINIC | Age: 44
End: 2024-07-18
Payer: COMMERCIAL

## 2024-07-18 DIAGNOSIS — F90.0 ATTENTION-DEFICIT HYPERACTIVITY DISORDER, PREDOMINANTLY INATTENTIVE TYPE: ICD-10-CM

## 2024-07-18 PROCEDURE — 90834 PSYTX W PT 45 MINUTES: CPT | Mod: 95

## 2024-07-25 ENCOUNTER — APPOINTMENT (OUTPATIENT)
Dept: PSYCHIATRY | Facility: CLINIC | Age: 44
End: 2024-07-25

## 2024-08-01 ENCOUNTER — APPOINTMENT (OUTPATIENT)
Dept: PSYCHIATRY | Facility: CLINIC | Age: 44
End: 2024-08-01
Payer: COMMERCIAL

## 2024-08-01 DIAGNOSIS — F90.0 ATTENTION-DEFICIT HYPERACTIVITY DISORDER, PREDOMINANTLY INATTENTIVE TYPE: ICD-10-CM

## 2024-08-01 PROCEDURE — 90834 PSYTX W PT 45 MINUTES: CPT | Mod: 95

## 2024-08-01 NOTE — PLAN
[FreeTextEntry2] : Pt to engage in weekly or biweekly individual psychotherapy to address sxs of ADHD, binge eating, and social anxiety  [Cognitive and/or Behavior Therapy] : Cognitive and/or Behavior Therapy  [Dialectical Behavior Therapy] : Dialectical Behavior Therapy  [Integrative Therapy] : Integrative Therapy  [Psychodynamic Therapy] : Psychodynamic Therapy  [Psychoeducation] : Psychoeducation  [de-identified] : Pt arrived on time for scheduled session. Presented as engaged and motivated for txt; affect was stable and euthymic. Session focused on themes related to pt's attachment style and goals for future romantic relationsihps as well as social anxiety and insecurities. Discussed ways in which pt can "find herself" again and explore what she enjoys. Writer provided support, feedback, and coping skills as needed throughout. Ended session with pt in bx and ex control.

## 2024-08-06 ENCOUNTER — NON-APPOINTMENT (OUTPATIENT)
Age: 44
End: 2024-08-06

## 2024-08-08 ENCOUNTER — APPOINTMENT (OUTPATIENT)
Dept: PSYCHIATRY | Facility: CLINIC | Age: 44
End: 2024-08-08

## 2024-08-12 ENCOUNTER — APPOINTMENT (OUTPATIENT)
Dept: PSYCHIATRY | Facility: CLINIC | Age: 44
End: 2024-08-12
Payer: COMMERCIAL

## 2024-08-12 DIAGNOSIS — F90.0 ATTENTION-DEFICIT HYPERACTIVITY DISORDER, PREDOMINANTLY INATTENTIVE TYPE: ICD-10-CM

## 2024-08-12 PROCEDURE — 99214 OFFICE O/P EST MOD 30 MIN: CPT | Mod: 95

## 2024-08-12 PROCEDURE — 90833 PSYTX W PT W E/M 30 MIN: CPT | Mod: 95

## 2024-08-12 PROCEDURE — G2211 COMPLEX E/M VISIT ADD ON: CPT | Mod: NC,95

## 2024-08-12 RX ORDER — DEXTROAMPHETAMINE SACCHARATE, AMPHETAMINE ASPARTATE MONOHYDRATE, DEXTROAMPHETAMINE SULFATE AND AMPHETAMINE SULFATE 2.5; 2.5; 2.5; 2.5 MG/1; MG/1; MG/1; MG/1
10 CAPSULE, EXTENDED RELEASE ORAL
Qty: 30 | Refills: 0 | Status: ACTIVE | COMMUNITY
Start: 2024-08-12 | End: 1900-01-01

## 2024-08-12 NOTE — REASON FOR VISIT
[Access issues (e.g., transportation, impaired mobility, etc.)] : due to patient's access issues [Telehealth (audio & video) - Individual/Group] : This visit was provided via telehealth using real-time 2-way audio visual technology. [Other Location: e.g. Home (Enter Location, City,State)___] : The provider was located at [unfilled]. [Home] : The patient, [unfilled], was located at home, [unfilled], at the time of the visit. [Verbal consent obtained from patient/other participant(s)] : Verbal consent for telehealth/telephonic services obtained from patient/other participant(s)

## 2024-08-12 NOTE — RISK ASSESSMENT
[No, patient denies ideation or behavior] : No, patient denies ideation or behavior [Low acute suicide risk] : Low acute suicide risk [Not clinically indicated] : Safety Plan completed/updated (for individuals at risk): Not clinically indicated [No] : No [FreeTextEntry9] : Low acute risk. No SI, plan, attempt. Low chronic risk - protective factors include family, connection to care, future orientation.

## 2024-08-12 NOTE — PSYCHOSOCIAL ASSESSMENT
[None known] : None known [Yes (add details)] : Patient attended school, home tutoring, or received education instruction at anytime in the past three months? 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 No [FreeTextEntry2] : The patient is currently  but  from her . They reside together in the same home for financial reasons. She has a five-year-old son with her current  and a fifteen-year-old son from a previous relationship. 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 This led to legal and financial struggles, especially as she was trying to maintain her visa status and caring for her two-year-old son without a partner's support. She then worked as a  and  until 2015, which is when she  her current , the father of her youngest child. Patient had a miscarriage, no further details discussed during the intake.  The patient demonstrates strength in managing daily parenting tasks despite her attention difficulties. She has established routines to help stay on track with her activities and parenting responsibilities. However, she faces financial challenges as she is currently unemployed and striving to achieve financial independence from her , with whom she has been  for several years. [FreeTextEntry3] : The patient indicates that her family resides in [place], and within the [place], her social [place] primarily consists of individuals she has met through her sons, such as other parents from their schools. Among these acquaintances, there is only one person with whom she feels comfortable sharing her confidences. She expresses a sense of loss because her friendships in the [place] have not been formed through mutual interests. 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

## 2024-08-12 NOTE — PLAN
[Yes. details: ___] : Yes, [unfilled] [Medication education provided] : Medication education provided. [Order(s) for medication placed in [facility] EHR] : Medication [FreeTextEntry4] : Pt attending appts and starting medication for ADHD. [FreeTextEntry5] : PLAN: #) continue adderall xr at lower dose of 10mg PO Qam #) f/u with psychiatry in 4-6 weeks #) continue I therapy

## 2024-08-15 ENCOUNTER — APPOINTMENT (OUTPATIENT)
Dept: PSYCHIATRY | Facility: CLINIC | Age: 44
End: 2024-08-15

## 2024-08-22 ENCOUNTER — APPOINTMENT (OUTPATIENT)
Dept: PSYCHIATRY | Facility: CLINIC | Age: 44
End: 2024-08-22

## 2024-08-27 ENCOUNTER — NON-APPOINTMENT (OUTPATIENT)
Age: 44
End: 2024-08-27

## 2024-08-29 ENCOUNTER — APPOINTMENT (OUTPATIENT)
Dept: PSYCHIATRY | Facility: CLINIC | Age: 44
End: 2024-08-29

## 2024-09-05 ENCOUNTER — APPOINTMENT (OUTPATIENT)
Dept: PSYCHIATRY | Facility: CLINIC | Age: 44
End: 2024-09-05

## 2024-09-12 ENCOUNTER — APPOINTMENT (OUTPATIENT)
Dept: PSYCHIATRY | Facility: CLINIC | Age: 44
End: 2024-09-12

## 2024-09-17 ENCOUNTER — APPOINTMENT (OUTPATIENT)
Dept: PSYCHIATRY | Facility: CLINIC | Age: 44
End: 2024-09-17
Payer: COMMERCIAL

## 2024-09-17 DIAGNOSIS — F90.0 ATTENTION-DEFICIT HYPERACTIVITY DISORDER, PREDOMINANTLY INATTENTIVE TYPE: ICD-10-CM

## 2024-09-17 PROCEDURE — 99214 OFFICE O/P EST MOD 30 MIN: CPT | Mod: 95

## 2024-09-17 PROCEDURE — 90833 PSYTX W PT W E/M 30 MIN: CPT | Mod: 95

## 2024-09-17 PROCEDURE — G2211 COMPLEX E/M VISIT ADD ON: CPT | Mod: NC,95

## 2024-09-17 NOTE — PLAN
[Yes. details: ___] : Yes, [unfilled] [Medication education provided] : Medication education provided. [Order(s) for medication placed in [facility] EHR] : Medication [FreeTextEntry5] : PLAN: #) continue adderall xr at lower dose of 10mg PO Qam alternating with 15mg as needed #) f/u with psychiatry in 4-6 weeks #) continue I therapy [FreeTextEntry4] : Pt attending appts and starting medication for ADHD.

## 2024-09-17 NOTE — PSYCHOSOCIAL ASSESSMENT
[None known] : None known [Yes (add details)] : Patient attended school, home tutoring, or received education instruction at anytime in the past three months? 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 No [FreeTextEntry2] : The patient is currently  but  from her . They reside together in the same home for financial reasons. She has a five-year-old son with her current  and a fifteen-year-old son from a previous relationship. 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 This led to legal and financial struggles, especially as she was trying to maintain her visa status and caring for her two-year-old son without a partner's support. She then worked as a  and  until 2015, which is when she  her current , the father of her youngest child. Patient had a miscarriage, no further details discussed during the intake.  The patient demonstrates strength in managing daily parenting tasks despite her attention difficulties. She has established routines to help stay on track with her activities and parenting responsibilities. However, she faces financial challenges as she is currently unemployed and striving to achieve financial independence from her , with whom she has been  for several years. [FreeTextEntry3] : The patient indicates that her family resides in [place], and within the [place], her social [place] primarily consists of individuals she has met through her sons, such as other parents from their schools. Among these acquaintances, there is only one person with whom she feels comfortable sharing her confidences. She expresses a sense of loss because her friendships in the [place] have not been formed through mutual interests. 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

## 2024-10-18 ENCOUNTER — APPOINTMENT (OUTPATIENT)
Dept: PSYCHIATRY | Facility: CLINIC | Age: 44
End: 2024-10-18
Payer: COMMERCIAL

## 2024-10-18 PROCEDURE — 90833 PSYTX W PT W E/M 30 MIN: CPT | Mod: 95

## 2024-10-18 PROCEDURE — G2211 COMPLEX E/M VISIT ADD ON: CPT | Mod: 95

## 2024-10-18 PROCEDURE — 99214 OFFICE O/P EST MOD 30 MIN: CPT | Mod: 95

## 2024-10-24 ENCOUNTER — APPOINTMENT (OUTPATIENT)
Dept: PSYCHIATRY | Facility: CLINIC | Age: 44
End: 2024-10-24

## 2024-10-24 DIAGNOSIS — F98.8 OTHER SPECIFIED BEHAVIORAL AND EMOTIONAL DISORDERS WITH ONSET USUALLY OCCURRING IN CHILDHOOD AND ADOLESCENCE: ICD-10-CM

## 2024-10-24 DIAGNOSIS — F90.0 ATTENTION-DEFICIT HYPERACTIVITY DISORDER, PREDOMINANTLY INATTENTIVE TYPE: ICD-10-CM

## 2024-10-28 ENCOUNTER — NON-APPOINTMENT (OUTPATIENT)
Age: 44
End: 2024-10-28

## 2024-11-07 ENCOUNTER — APPOINTMENT (OUTPATIENT)
Dept: PSYCHIATRY | Facility: CLINIC | Age: 44
End: 2024-11-07

## 2024-11-07 ENCOUNTER — NON-APPOINTMENT (OUTPATIENT)
Age: 44
End: 2024-11-07

## 2024-11-07 DIAGNOSIS — F98.8 OTHER SPECIFIED BEHAVIORAL AND EMOTIONAL DISORDERS WITH ONSET USUALLY OCCURRING IN CHILDHOOD AND ADOLESCENCE: ICD-10-CM

## 2024-11-14 ENCOUNTER — APPOINTMENT (OUTPATIENT)
Dept: PSYCHIATRY | Facility: CLINIC | Age: 44
End: 2024-11-14

## 2024-11-15 ENCOUNTER — APPOINTMENT (OUTPATIENT)
Dept: PSYCHIATRY | Facility: CLINIC | Age: 44
End: 2024-11-15
Payer: COMMERCIAL

## 2024-11-15 DIAGNOSIS — F90.0 ATTENTION-DEFICIT HYPERACTIVITY DISORDER, PREDOMINANTLY INATTENTIVE TYPE: ICD-10-CM

## 2024-11-15 PROCEDURE — G2211 COMPLEX E/M VISIT ADD ON: CPT | Mod: 95

## 2024-11-15 PROCEDURE — 90833 PSYTX W PT W E/M 30 MIN: CPT | Mod: 95

## 2024-11-15 PROCEDURE — 99214 OFFICE O/P EST MOD 30 MIN: CPT | Mod: 95

## 2024-12-01 ENCOUNTER — OUTPATIENT (OUTPATIENT)
Dept: OUTPATIENT SERVICES | Facility: HOSPITAL | Age: 44
LOS: 1 days | Discharge: HOME | End: 2024-12-01

## 2024-12-05 ENCOUNTER — APPOINTMENT (OUTPATIENT)
Dept: PSYCHIATRY | Facility: CLINIC | Age: 44
End: 2024-12-05

## 2024-12-10 DIAGNOSIS — F98.8 OTHER SPECIFIED BEHAVIORAL AND EMOTIONAL DISORDERS WITH ONSET USUALLY OCCURRING IN CHILDHOOD AND ADOLESCENCE: ICD-10-CM

## 2024-12-10 DIAGNOSIS — F90.0 ATTENTION-DEFICIT HYPERACTIVITY DISORDER, PREDOMINANTLY INATTENTIVE TYPE: ICD-10-CM

## 2024-12-10 DIAGNOSIS — F43.23 ADJUSTMENT DISORDER WITH MIXED ANXIETY AND DEPRESSED MOOD: ICD-10-CM

## 2024-12-12 ENCOUNTER — APPOINTMENT (OUTPATIENT)
Dept: PSYCHIATRY | Facility: CLINIC | Age: 44
End: 2024-12-12

## 2024-12-13 ENCOUNTER — APPOINTMENT (OUTPATIENT)
Dept: PSYCHIATRY | Facility: CLINIC | Age: 44
End: 2024-12-13

## 2024-12-13 PROCEDURE — 99214 OFFICE O/P EST MOD 30 MIN: CPT | Mod: 95

## 2024-12-13 PROCEDURE — G2211 COMPLEX E/M VISIT ADD ON: CPT | Mod: 95

## 2024-12-13 PROCEDURE — 90833 PSYTX W PT W E/M 30 MIN: CPT | Mod: 95

## 2024-12-17 ENCOUNTER — APPOINTMENT (OUTPATIENT)
Dept: PSYCHIATRY | Facility: CLINIC | Age: 44
End: 2024-12-17

## 2024-12-17 DIAGNOSIS — F90.0 ATTENTION-DEFICIT HYPERACTIVITY DISORDER, PREDOMINANTLY INATTENTIVE TYPE: ICD-10-CM

## 2024-12-17 DIAGNOSIS — F98.8 OTHER SPECIFIED BEHAVIORAL AND EMOTIONAL DISORDERS WITH ONSET USUALLY OCCURRING IN CHILDHOOD AND ADOLESCENCE: ICD-10-CM

## 2024-12-26 ENCOUNTER — APPOINTMENT (OUTPATIENT)
Dept: PSYCHIATRY | Facility: CLINIC | Age: 44
End: 2024-12-26

## 2025-01-02 ENCOUNTER — APPOINTMENT (OUTPATIENT)
Dept: PSYCHIATRY | Facility: CLINIC | Age: 45
End: 2025-01-02

## 2025-01-07 ENCOUNTER — APPOINTMENT (OUTPATIENT)
Dept: PSYCHIATRY | Facility: CLINIC | Age: 45
End: 2025-01-07

## 2025-01-09 ENCOUNTER — APPOINTMENT (OUTPATIENT)
Dept: PSYCHIATRY | Facility: CLINIC | Age: 45
End: 2025-01-09

## 2025-01-10 ENCOUNTER — APPOINTMENT (OUTPATIENT)
Dept: INTERNAL MEDICINE | Facility: CLINIC | Age: 45
End: 2025-01-10

## 2025-01-10 ENCOUNTER — NON-APPOINTMENT (OUTPATIENT)
Age: 45
End: 2025-01-10

## 2025-01-10 VITALS
TEMPERATURE: 97.9 F | WEIGHT: 136 LBS | BODY MASS INDEX: 25.03 KG/M2 | SYSTOLIC BLOOD PRESSURE: 113 MMHG | HEIGHT: 62 IN | HEART RATE: 88 BPM | DIASTOLIC BLOOD PRESSURE: 78 MMHG | OXYGEN SATURATION: 100 %

## 2025-01-10 DIAGNOSIS — Z82.49 FAMILY HISTORY OF ISCHEMIC HEART DISEASE AND OTHER DISEASES OF THE CIRCULATORY SYSTEM: ICD-10-CM

## 2025-01-10 DIAGNOSIS — Z12.11 ENCOUNTER FOR SCREENING FOR MALIGNANT NEOPLASM OF COLON: ICD-10-CM

## 2025-01-10 DIAGNOSIS — E78.5 HYPERLIPIDEMIA, UNSPECIFIED: ICD-10-CM

## 2025-01-10 DIAGNOSIS — Z13.228 ENCOUNTER FOR SCREENING FOR OTHER METABOLIC DISORDERS: ICD-10-CM

## 2025-01-10 DIAGNOSIS — Z13.6 ENCOUNTER FOR SCREENING FOR CARDIOVASCULAR DISORDERS: ICD-10-CM

## 2025-01-10 DIAGNOSIS — Z80.52 FAMILY HISTORY OF MALIGNANT NEOPLASM OF BLADDER: ICD-10-CM

## 2025-01-10 DIAGNOSIS — Z00.00 ENCOUNTER FOR GENERAL ADULT MEDICAL EXAMINATION W/OUT ABNORMAL FINDINGS: ICD-10-CM

## 2025-01-10 PROCEDURE — 99386 PREV VISIT NEW AGE 40-64: CPT | Mod: 25

## 2025-01-10 PROCEDURE — 93000 ELECTROCARDIOGRAM COMPLETE: CPT | Mod: 59

## 2025-01-10 PROCEDURE — 36415 COLL VENOUS BLD VENIPUNCTURE: CPT

## 2025-01-10 RX ORDER — DEXTROAMPHETAMINE SACCHARATE, AMPHETAMINE ASPARTATE, DEXTROAMPHETAMINE SULFATE, AND AMPHETAMINE SULFATE 2.5; 2.5; 2.5; 2.5 MG/1; MG/1; MG/1; MG/1
10 TABLET ORAL DAILY
Refills: 0 | Status: ACTIVE | COMMUNITY

## 2025-01-11 LAB
ALBUMIN SERPL ELPH-MCNC: 4.5 G/DL
ALP BLD-CCNC: 65 U/L
ALT SERPL-CCNC: 13 U/L
ANION GAP SERPL CALC-SCNC: 13 MMOL/L
AST SERPL-CCNC: 13 U/L
BASOPHILS # BLD AUTO: 0.05 K/UL
BASOPHILS NFR BLD AUTO: 0.8 %
BILIRUB SERPL-MCNC: 0.4 MG/DL
BUN SERPL-MCNC: 16 MG/DL
CALCIUM SERPL-MCNC: 9.3 MG/DL
CHLORIDE SERPL-SCNC: 101 MMOL/L
CHOLEST SERPL-MCNC: 246 MG/DL
CO2 SERPL-SCNC: 24 MMOL/L
CREAT SERPL-MCNC: 1.11 MG/DL
EGFR: 63 ML/MIN/1.73M2
EOSINOPHIL # BLD AUTO: 0.28 K/UL
EOSINOPHIL NFR BLD AUTO: 4.5 %
ESTIMATED AVERAGE GLUCOSE: 108 MG/DL
FOLATE SERPL-MCNC: 10.6 NG/ML
GLUCOSE SERPL-MCNC: 85 MG/DL
HBA1C MFR BLD HPLC: 5.4 %
HCT VFR BLD CALC: 40.5 %
HDLC SERPL-MCNC: 76 MG/DL
HGB BLD-MCNC: 13.2 G/DL
IMM GRANULOCYTES NFR BLD AUTO: 0.3 %
LDLC SERPL CALC-MCNC: 158 MG/DL
LYMPHOCYTES # BLD AUTO: 1.52 K/UL
LYMPHOCYTES NFR BLD AUTO: 24.6 %
MAN DIFF?: NORMAL
MCHC RBC-ENTMCNC: 30.7 PG
MCHC RBC-ENTMCNC: 32.6 G/DL
MCV RBC AUTO: 94.2 FL
MONOCYTES # BLD AUTO: 0.35 K/UL
MONOCYTES NFR BLD AUTO: 5.7 %
NEUTROPHILS # BLD AUTO: 3.96 K/UL
NEUTROPHILS NFR BLD AUTO: 64.1 %
NONHDLC SERPL-MCNC: 171 MG/DL
PLATELET # BLD AUTO: 233 K/UL
POTASSIUM SERPL-SCNC: 4 MMOL/L
PROT SERPL-MCNC: 7.3 G/DL
RBC # BLD: 4.3 M/UL
RBC # FLD: 13 %
SODIUM SERPL-SCNC: 138 MMOL/L
TRIGL SERPL-MCNC: 75 MG/DL
TSH SERPL-ACNC: 0.97 UIU/ML
VIT B12 SERPL-MCNC: 465 PG/ML
WBC # FLD AUTO: 6.18 K/UL

## 2025-01-14 ENCOUNTER — APPOINTMENT (OUTPATIENT)
Dept: PSYCHIATRY | Facility: CLINIC | Age: 45
End: 2025-01-14

## 2025-01-16 ENCOUNTER — APPOINTMENT (OUTPATIENT)
Dept: PSYCHIATRY | Facility: CLINIC | Age: 45
End: 2025-01-16

## 2025-01-17 ENCOUNTER — APPOINTMENT (OUTPATIENT)
Dept: PSYCHIATRY | Facility: CLINIC | Age: 45
End: 2025-01-17
Payer: COMMERCIAL

## 2025-01-17 PROCEDURE — 99213 OFFICE O/P EST LOW 20 MIN: CPT | Mod: 95

## 2025-01-17 RX ORDER — DEXTROAMPHETAMINE SACCHARATE, AMPHETAMINE ASPARTATE MONOHYDRATE, DEXTROAMPHETAMINE SULFATE AND AMPHETAMINE SULFATE 2.5; 2.5; 2.5; 2.5 MG/1; MG/1; MG/1; MG/1
10 CAPSULE, EXTENDED RELEASE ORAL
Qty: 30 | Refills: 0 | Status: ACTIVE | COMMUNITY
Start: 2025-01-17 | End: 1900-01-01

## 2025-01-21 ENCOUNTER — APPOINTMENT (OUTPATIENT)
Dept: PSYCHIATRY | Facility: CLINIC | Age: 45
End: 2025-01-21

## 2025-01-23 ENCOUNTER — APPOINTMENT (OUTPATIENT)
Dept: PSYCHIATRY | Facility: CLINIC | Age: 45
End: 2025-01-23

## 2025-01-27 ENCOUNTER — TRANSCRIPTION ENCOUNTER (OUTPATIENT)
Age: 45
End: 2025-01-27

## 2025-01-28 ENCOUNTER — APPOINTMENT (OUTPATIENT)
Dept: PSYCHIATRY | Facility: CLINIC | Age: 45
End: 2025-01-28

## 2025-01-28 DIAGNOSIS — F90.0 ATTENTION-DEFICIT HYPERACTIVITY DISORDER, PREDOMINANTLY INATTENTIVE TYPE: ICD-10-CM

## 2025-01-28 DIAGNOSIS — F98.8 OTHER SPECIFIED BEHAVIORAL AND EMOTIONAL DISORDERS WITH ONSET USUALLY OCCURRING IN CHILDHOOD AND ADOLESCENCE: ICD-10-CM

## 2025-02-04 ENCOUNTER — APPOINTMENT (OUTPATIENT)
Dept: PSYCHIATRY | Facility: CLINIC | Age: 45
End: 2025-02-04

## 2025-02-06 ENCOUNTER — APPOINTMENT (OUTPATIENT)
Dept: PSYCHIATRY | Facility: CLINIC | Age: 45
End: 2025-02-06

## 2025-02-06 DIAGNOSIS — F98.8 OTHER SPECIFIED BEHAVIORAL AND EMOTIONAL DISORDERS WITH ONSET USUALLY OCCURRING IN CHILDHOOD AND ADOLESCENCE: ICD-10-CM

## 2025-02-06 DIAGNOSIS — F90.0 ATTENTION-DEFICIT HYPERACTIVITY DISORDER, PREDOMINANTLY INATTENTIVE TYPE: ICD-10-CM

## 2025-02-11 ENCOUNTER — APPOINTMENT (OUTPATIENT)
Dept: PSYCHIATRY | Facility: CLINIC | Age: 45
End: 2025-02-11

## 2025-02-13 ENCOUNTER — APPOINTMENT (OUTPATIENT)
Dept: PSYCHIATRY | Facility: CLINIC | Age: 45
End: 2025-02-13

## 2025-02-25 ENCOUNTER — APPOINTMENT (OUTPATIENT)
Dept: PSYCHIATRY | Facility: CLINIC | Age: 45
End: 2025-02-25

## 2025-02-27 ENCOUNTER — APPOINTMENT (OUTPATIENT)
Dept: PSYCHIATRY | Facility: CLINIC | Age: 45
End: 2025-02-27

## 2025-02-28 ENCOUNTER — APPOINTMENT (OUTPATIENT)
Dept: PSYCHIATRY | Facility: CLINIC | Age: 45
End: 2025-02-28
Payer: COMMERCIAL

## 2025-02-28 DIAGNOSIS — F90.0 ATTENTION-DEFICIT HYPERACTIVITY DISORDER, PREDOMINANTLY INATTENTIVE TYPE: ICD-10-CM

## 2025-02-28 PROCEDURE — G2211 COMPLEX E/M VISIT ADD ON: CPT | Mod: NC,95

## 2025-02-28 PROCEDURE — 99214 OFFICE O/P EST MOD 30 MIN: CPT | Mod: 95

## 2025-02-28 PROCEDURE — 90833 PSYTX W PT W E/M 30 MIN: CPT | Mod: 95

## 2025-03-04 ENCOUNTER — APPOINTMENT (OUTPATIENT)
Dept: PSYCHIATRY | Facility: CLINIC | Age: 45
End: 2025-03-04

## 2025-03-06 ENCOUNTER — APPOINTMENT (OUTPATIENT)
Dept: PSYCHIATRY | Facility: CLINIC | Age: 45
End: 2025-03-06

## 2025-03-11 ENCOUNTER — APPOINTMENT (OUTPATIENT)
Dept: PSYCHIATRY | Facility: CLINIC | Age: 45
End: 2025-03-11

## 2025-03-13 ENCOUNTER — APPOINTMENT (OUTPATIENT)
Dept: PSYCHIATRY | Facility: CLINIC | Age: 45
End: 2025-03-13

## 2025-03-18 ENCOUNTER — APPOINTMENT (OUTPATIENT)
Dept: PSYCHIATRY | Facility: CLINIC | Age: 45
End: 2025-03-18

## 2025-03-20 ENCOUNTER — APPOINTMENT (OUTPATIENT)
Dept: PSYCHIATRY | Facility: CLINIC | Age: 45
End: 2025-03-20

## 2025-03-25 ENCOUNTER — APPOINTMENT (OUTPATIENT)
Dept: PSYCHIATRY | Facility: CLINIC | Age: 45
End: 2025-03-25

## 2025-03-25 DIAGNOSIS — F98.8 OTHER SPECIFIED BEHAVIORAL AND EMOTIONAL DISORDERS WITH ONSET USUALLY OCCURRING IN CHILDHOOD AND ADOLESCENCE: ICD-10-CM

## 2025-03-27 ENCOUNTER — APPOINTMENT (OUTPATIENT)
Dept: PSYCHIATRY | Facility: CLINIC | Age: 45
End: 2025-03-27

## 2025-03-28 ENCOUNTER — APPOINTMENT (OUTPATIENT)
Dept: PSYCHIATRY | Facility: CLINIC | Age: 45
End: 2025-03-28
Payer: COMMERCIAL

## 2025-03-28 PROCEDURE — G2211 COMPLEX E/M VISIT ADD ON: CPT | Mod: NC,95

## 2025-03-28 PROCEDURE — 99214 OFFICE O/P EST MOD 30 MIN: CPT | Mod: 95

## 2025-03-28 PROCEDURE — 90833 PSYTX W PT W E/M 30 MIN: CPT | Mod: 95

## 2025-04-01 ENCOUNTER — APPOINTMENT (OUTPATIENT)
Dept: PSYCHIATRY | Facility: CLINIC | Age: 45
End: 2025-04-01

## 2025-04-03 ENCOUNTER — APPOINTMENT (OUTPATIENT)
Dept: PSYCHIATRY | Facility: CLINIC | Age: 45
End: 2025-04-03

## 2025-04-08 ENCOUNTER — APPOINTMENT (OUTPATIENT)
Dept: PSYCHIATRY | Facility: CLINIC | Age: 45
End: 2025-04-08

## 2025-04-08 DIAGNOSIS — F90.0 ATTENTION-DEFICIT HYPERACTIVITY DISORDER, PREDOMINANTLY INATTENTIVE TYPE: ICD-10-CM

## 2025-04-10 ENCOUNTER — APPOINTMENT (OUTPATIENT)
Dept: PSYCHIATRY | Facility: CLINIC | Age: 45
End: 2025-04-10

## 2025-04-15 ENCOUNTER — APPOINTMENT (OUTPATIENT)
Dept: PSYCHIATRY | Facility: CLINIC | Age: 45
End: 2025-04-15

## 2025-04-17 ENCOUNTER — APPOINTMENT (OUTPATIENT)
Dept: PSYCHIATRY | Facility: CLINIC | Age: 45
End: 2025-04-17

## 2025-04-22 ENCOUNTER — APPOINTMENT (OUTPATIENT)
Dept: PSYCHIATRY | Facility: CLINIC | Age: 45
End: 2025-04-22

## 2025-04-24 ENCOUNTER — APPOINTMENT (OUTPATIENT)
Dept: PSYCHIATRY | Facility: CLINIC | Age: 45
End: 2025-04-24

## 2025-04-29 ENCOUNTER — APPOINTMENT (OUTPATIENT)
Dept: PSYCHIATRY | Facility: CLINIC | Age: 45
End: 2025-04-29

## 2025-04-30 ENCOUNTER — APPOINTMENT (OUTPATIENT)
Dept: PSYCHIATRY | Facility: CLINIC | Age: 45
End: 2025-04-30
Payer: COMMERCIAL

## 2025-04-30 PROCEDURE — 99213 OFFICE O/P EST LOW 20 MIN: CPT | Mod: 95

## 2025-04-30 PROCEDURE — G2211 COMPLEX E/M VISIT ADD ON: CPT | Mod: NC,95

## 2025-05-01 ENCOUNTER — APPOINTMENT (OUTPATIENT)
Dept: PSYCHIATRY | Facility: CLINIC | Age: 45
End: 2025-05-01

## 2025-05-06 ENCOUNTER — APPOINTMENT (OUTPATIENT)
Dept: PSYCHIATRY | Facility: CLINIC | Age: 45
End: 2025-05-06

## 2025-05-06 DIAGNOSIS — F98.8 OTHER SPECIFIED BEHAVIORAL AND EMOTIONAL DISORDERS WITH ONSET USUALLY OCCURRING IN CHILDHOOD AND ADOLESCENCE: ICD-10-CM

## 2025-05-08 ENCOUNTER — APPOINTMENT (OUTPATIENT)
Dept: PSYCHIATRY | Facility: CLINIC | Age: 45
End: 2025-05-08

## 2025-05-13 ENCOUNTER — APPOINTMENT (OUTPATIENT)
Dept: PSYCHIATRY | Facility: CLINIC | Age: 45
End: 2025-05-13

## 2025-05-15 ENCOUNTER — APPOINTMENT (OUTPATIENT)
Dept: PSYCHIATRY | Facility: CLINIC | Age: 45
End: 2025-05-15

## 2025-05-20 ENCOUNTER — APPOINTMENT (OUTPATIENT)
Dept: PSYCHIATRY | Facility: CLINIC | Age: 45
End: 2025-05-20

## 2025-05-22 ENCOUNTER — APPOINTMENT (OUTPATIENT)
Dept: PSYCHIATRY | Facility: CLINIC | Age: 45
End: 2025-05-22

## 2025-05-27 ENCOUNTER — APPOINTMENT (OUTPATIENT)
Dept: PSYCHIATRY | Facility: CLINIC | Age: 45
End: 2025-05-27

## 2025-05-28 ENCOUNTER — APPOINTMENT (OUTPATIENT)
Dept: PSYCHIATRY | Facility: CLINIC | Age: 45
End: 2025-05-28
Payer: COMMERCIAL

## 2025-05-28 PROCEDURE — 90833 PSYTX W PT W E/M 30 MIN: CPT | Mod: 95

## 2025-05-28 PROCEDURE — G2211 COMPLEX E/M VISIT ADD ON: CPT | Mod: NC,95

## 2025-05-28 PROCEDURE — 99214 OFFICE O/P EST MOD 30 MIN: CPT | Mod: 95

## 2025-05-29 ENCOUNTER — APPOINTMENT (OUTPATIENT)
Dept: PSYCHIATRY | Facility: CLINIC | Age: 45
End: 2025-05-29

## 2025-05-29 DIAGNOSIS — F90.0 ATTENTION-DEFICIT HYPERACTIVITY DISORDER, PREDOMINANTLY INATTENTIVE TYPE: ICD-10-CM

## 2025-06-03 ENCOUNTER — APPOINTMENT (OUTPATIENT)
Dept: PSYCHIATRY | Facility: CLINIC | Age: 45
End: 2025-06-03

## 2025-06-05 ENCOUNTER — APPOINTMENT (OUTPATIENT)
Dept: PSYCHIATRY | Facility: CLINIC | Age: 45
End: 2025-06-05

## 2025-06-17 ENCOUNTER — APPOINTMENT (OUTPATIENT)
Dept: PSYCHIATRY | Facility: CLINIC | Age: 45
End: 2025-06-17

## 2025-06-24 ENCOUNTER — APPOINTMENT (OUTPATIENT)
Dept: PSYCHIATRY | Facility: CLINIC | Age: 45
End: 2025-06-24

## 2025-06-26 ENCOUNTER — APPOINTMENT (OUTPATIENT)
Dept: PSYCHIATRY | Facility: CLINIC | Age: 45
End: 2025-06-26
Payer: COMMERCIAL

## 2025-06-26 PROCEDURE — 99213 OFFICE O/P EST LOW 20 MIN: CPT | Mod: 95

## 2025-06-26 PROCEDURE — 90833 PSYTX W PT W E/M 30 MIN: CPT | Mod: 95

## 2025-06-30 RX ORDER — LISDEXAMFETAMINE DIMESYLATE 30 MG/1
30 CAPSULE ORAL
Qty: 30 | Refills: 0 | Status: ACTIVE | COMMUNITY
Start: 2025-06-30 | End: 1900-01-01

## 2025-08-06 ENCOUNTER — APPOINTMENT (OUTPATIENT)
Dept: PSYCHIATRY | Facility: CLINIC | Age: 45
End: 2025-08-06

## 2025-08-06 DIAGNOSIS — F90.0 ATTENTION-DEFICIT HYPERACTIVITY DISORDER, PREDOMINANTLY INATTENTIVE TYPE: ICD-10-CM

## 2025-08-06 PROCEDURE — G2211 COMPLEX E/M VISIT ADD ON: CPT | Mod: NC,95

## 2025-08-06 PROCEDURE — 99214 OFFICE O/P EST MOD 30 MIN: CPT | Mod: 95

## 2025-09-03 ENCOUNTER — APPOINTMENT (OUTPATIENT)
Dept: PSYCHIATRY | Facility: CLINIC | Age: 45
End: 2025-09-03
Payer: COMMERCIAL

## 2025-09-03 DIAGNOSIS — F90.0 ATTENTION-DEFICIT HYPERACTIVITY DISORDER, PREDOMINANTLY INATTENTIVE TYPE: ICD-10-CM

## 2025-09-03 PROCEDURE — 99214 OFFICE O/P EST MOD 30 MIN: CPT | Mod: 95

## 2025-09-03 PROCEDURE — G2211 COMPLEX E/M VISIT ADD ON: CPT | Mod: NC,95
